# Patient Record
Sex: FEMALE | Race: WHITE | NOT HISPANIC OR LATINO | Employment: FULL TIME | URBAN - METROPOLITAN AREA
[De-identification: names, ages, dates, MRNs, and addresses within clinical notes are randomized per-mention and may not be internally consistent; named-entity substitution may affect disease eponyms.]

---

## 2016-08-08 LAB
ERYTHROCYTE [DISTWIDTH] IN BLOOD BY AUTOMATED COUNT: 12.7 %
HBA1C MFR BLD: 5 % (ref 0–5.7)
HCT VFR BLD AUTO: 35.9 %
HEMOGLOBIN: 12.2 G/DL (ref 11.7–15.7)
MCH RBC QN AUTO: NORMAL PG
MCHC RBC AUTO-ENTMCNC: NORMAL G/DL
MCV RBC AUTO: 92.1 FL
PLATELET # BLD AUTO: 310 10^9/L
RBC # BLD AUTO: 3.9 10^12/L
WBC # BLD AUTO: 11.2 10^9/L

## 2016-12-09 LAB — HEMOGLOBIN: 11.1 G/DL (ref 11.7–15.7)

## 2017-01-04 ENCOUNTER — PRE VISIT (OUTPATIENT)
Dept: CARDIOLOGY | Facility: CLINIC | Age: 27
End: 2017-01-04

## 2017-01-04 ENCOUNTER — TELEPHONE (OUTPATIENT)
Dept: CARDIOLOGY | Facility: CLINIC | Age: 27
End: 2017-01-04

## 2017-01-04 ENCOUNTER — DOCUMENTATION ONLY (OUTPATIENT)
Dept: CARDIOLOGY | Facility: CLINIC | Age: 27
End: 2017-01-04

## 2017-01-04 NOTE — TELEPHONE ENCOUNTER
Called pt. To discuss OV. 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  Pt. Stated she will bring the EKG report with her to her OV on 1/6/17. All other records received and sent for scanning.

## 2017-01-05 ENCOUNTER — TELEPHONE (OUTPATIENT)
Dept: CARDIOLOGY | Facility: CLINIC | Age: 27
End: 2017-01-05

## 2017-01-05 NOTE — TELEPHONE ENCOUNTER
Pt. Has US scheduled in [place] in AM and cardiology OV in [place] in PM.  Called pt. To make sure she is aware of the different locations.  Pt. States she is aware of the two different locations for her same day appointments.

## 2017-01-06 ENCOUNTER — OFFICE VISIT (OUTPATIENT)
Dept: CARDIOLOGY | Facility: CLINIC | Age: 27
End: 2017-01-06
Payer: COMMERCIAL

## 2017-01-06 ENCOUNTER — OFFICE VISIT (OUTPATIENT)
Dept: MATERNAL FETAL MEDICINE | Facility: CLINIC | Age: 27
End: 2017-01-06
Attending: OBSTETRICS & GYNECOLOGY
Payer: COMMERCIAL

## 2017-01-06 ENCOUNTER — HOSPITAL ENCOUNTER (OUTPATIENT)
Dept: ULTRASOUND IMAGING | Facility: CLINIC | Age: 27
Discharge: HOME | End: 2017-01-06
Attending: OBSTETRICS & GYNECOLOGY | Admitting: OBSTETRICS & GYNECOLOGY
Payer: COMMERCIAL

## 2017-01-06 VITALS
DIASTOLIC BLOOD PRESSURE: 50 MMHG | SYSTOLIC BLOOD PRESSURE: 116 MMHG | HEIGHT: 65 IN | BODY MASS INDEX: 36.85 KG/M2 | WEIGHT: 221.2 LBS | HEART RATE: 110 BPM

## 2017-01-06 DIAGNOSIS — O30.042 DICHORIONIC DIAMNIOTIC TWIN PREGNANCY IN SECOND TRIMESTER: ICD-10-CM

## 2017-01-06 DIAGNOSIS — R00.0 TACHYCARDIA: ICD-10-CM

## 2017-01-06 DIAGNOSIS — R00.0 TACHYCARDIA: Primary | ICD-10-CM

## 2017-01-06 DIAGNOSIS — O30.043 DICHORIONIC DIAMNIOTIC TWIN PREGNANCY IN THIRD TRIMESTER: Primary | ICD-10-CM

## 2017-01-06 LAB
ALBUMIN SERPL-MCNC: 2.6 G/DL (ref 3.4–5)
ALP SERPL-CCNC: 102 U/L (ref 40–150)
ALT SERPL W P-5'-P-CCNC: 13 U/L (ref 0–50)
ANION GAP SERPL CALCULATED.3IONS-SCNC: 11 MMOL/L (ref 3–14)
AST SERPL W P-5'-P-CCNC: 14 U/L (ref 0–45)
BILIRUB SERPL-MCNC: 0.3 MG/DL (ref 0.2–1.3)
BUN SERPL-MCNC: 6 MG/DL (ref 7–30)
CALCIUM SERPL-MCNC: 8.6 MG/DL (ref 8.5–10.1)
CHLORIDE SERPL-SCNC: 105 MMOL/L (ref 94–109)
CO2 SERPL-SCNC: 22 MMOL/L (ref 20–32)
CREAT SERPL-MCNC: 0.56 MG/DL (ref 0.52–1.04)
ERYTHROCYTE [DISTWIDTH] IN BLOOD BY AUTOMATED COUNT: 13.5 % (ref 10–15)
GFR SERPL CREATININE-BSD FRML MDRD: ABNORMAL ML/MIN/1.7M2
GLUCOSE SERPL-MCNC: 81 MG/DL (ref 70–99)
HCT VFR BLD AUTO: 32.9 % (ref 35–47)
HGB BLD-MCNC: 10.9 G/DL (ref 11.7–15.7)
MCH RBC QN AUTO: 30.4 PG (ref 26.5–33)
MCHC RBC AUTO-ENTMCNC: 33.1 G/DL (ref 31.5–36.5)
MCV RBC AUTO: 92 FL (ref 78–100)
PLATELET # BLD AUTO: 263 10E9/L (ref 150–450)
POTASSIUM SERPL-SCNC: 3.8 MMOL/L (ref 3.4–5.3)
PROT SERPL-MCNC: 6.7 G/DL (ref 6.8–8.8)
RBC # BLD AUTO: 3.59 10E12/L (ref 3.8–5.2)
SODIUM SERPL-SCNC: 138 MMOL/L (ref 133–144)
TSH SERPL DL<=0.005 MIU/L-ACNC: 0.9 MU/L (ref 0.4–4)
WBC # BLD AUTO: 10.4 10E9/L (ref 4–11)

## 2017-01-06 PROCEDURE — 99204 OFFICE O/P NEW MOD 45 MIN: CPT | Performed by: INTERNAL MEDICINE

## 2017-01-06 PROCEDURE — 36415 COLL VENOUS BLD VENIPUNCTURE: CPT | Performed by: INTERNAL MEDICINE

## 2017-01-06 PROCEDURE — 76816 OB US FOLLOW-UP PER FETUS: CPT | Mod: 59

## 2017-01-06 PROCEDURE — 93000 ELECTROCARDIOGRAM COMPLETE: CPT | Performed by: INTERNAL MEDICINE

## 2017-01-06 PROCEDURE — 99000 SPECIMEN HANDLING OFFICE-LAB: CPT | Performed by: INTERNAL MEDICINE

## 2017-01-06 PROCEDURE — 85027 COMPLETE CBC AUTOMATED: CPT | Mod: 90 | Performed by: INTERNAL MEDICINE

## 2017-01-06 PROCEDURE — 84443 ASSAY THYROID STIM HORMONE: CPT | Mod: 90 | Performed by: INTERNAL MEDICINE

## 2017-01-06 PROCEDURE — 80053 COMPREHEN METABOLIC PANEL: CPT | Mod: 90 | Performed by: INTERNAL MEDICINE

## 2017-01-06 NOTE — PROGRESS NOTES
REASON FOR VISIT:  New patient evaluation for sinus tachycardia.      HISTORY OF PRESENT ILLNESS:  Ms. Miner is a very pleasant 26-year-old lady who is 30 weeks' pregnant with twins.  She states she notes a sensation of a rapid heartbeat when she is exerting herself up to 160 beats per minute and at rest as well and this has been found to be in sinus tachycardia.  She does have shortness of breath on exertion and sometimes notices at rest, particularly also she states if she leaned back in her chair.  She sleeps on her right side but is having difficulty with general discomfort as well as perhaps finding it a bit more hard to breathe recently.  She has no pedal edema.  She has no chest discomfort, presyncope or syncope.  Her weight is 221 pounds.  Weight in 2012 was 163 pounds.  EKG at this visit is entirely benign.      ASSESSMENT AND PLAN:  Pleasant 26-year-old lady with sinus tachycardia in the setting of an advanced pregnancy with twins.  I think this likely represents the result of possibly IVC compression and substantial fluid loads and shifts as supposed to a pathological mechanism.  As we have discussed sinus tachycardia is a secondary phenomenon.      We will go ahead and check a blood count, TSH and chemistries, as well as an echocardiogram for structural evaluation.  She denies any fevers or chills as other possible driving forces.      We will have her back in close followup and make further recommendations pending the results.  It was a pleasure being involved in her care.      Total time is 45 minutes, 40 in coordination, care and counseling.      Heaven Estrella MD      cc:   Nicolette Schaefer MD   [facility] [address] [place] [address]         HEAVEN ESTRELLA MD             D: 2017 14:38   T: 2017 15:41   MT: al      Name:     MILO MINER   MRN:      [id number]        Account:      [id number]   :      1990           Service Date:  01/06/2017      Document: [id number]

## 2017-01-06 NOTE — Clinical Note
1/6/2017    Nicolette Richard [facility]  [address] [place] [address]    RE: Evelyn HERRON Kristofer       Dear Colleague,    I had the pleasure of seeing Evelyn Miner in the [facility].    REASON FOR VISIT:  New patient evaluation for sinus tachycardia.      Ms. Miner is a very pleasant 26-year-old lady who is 30 weeks' pregnant with twins.  She states she notes a sensation of a rapid heartbeat when she is exerting herself up to 160 beats per minute and at rest as well and this has been found to be in sinus tachycardia.  She does have shortness of breath on exertion and sometimes notices at rest, particularly also she states if she leaned back in her chair.  She sleeps on her right side but is having difficulty with general discomfort as well as perhaps finding it a bit more hard to breathe recently.  She has no pedal edema.  She has no chest discomfort, presyncope or syncope.  Her weight is 221 pounds.  Weight in 2012 was 163 pounds.  EKG at this visit is entirely benign.      Outpatient Encounter Prescriptions as of 1/6/2017   Medication Sig Dispense Refill     Magnesium Hydroxide (MILK OF MAGNESIA PO) prn       Prenatal Vit-Fe Fumarate-FA (PRENATAL MULTIVITAMIN  PLUS IRON) 27-0.8 MG TABS Take 1 tablet by mouth daily       sennosides (SENOKOT) 8.6 MG tablet Take 1 tablet by mouth daily as needed        No facility-administered encounter medications on file as of 1/6/2017.     ASSESSMENT AND PLAN:  Pleasant 26-year-old lady with sinus tachycardia in the setting of an advanced pregnancy with twins.  I think this likely represents the result of possibly IVC compression and substantial fluid loads and shifts as supposed to a pathological mechanism.  As we have discussed sinus tachycardia is a secondary phenomenon.      We will go ahead and check a blood count, TSH and chemistries, as well as an echocardiogram for structural evaluation.  She denies any fevers or  chills as other possible driving forces.      We will have her back in close followup and make further recommendations pending the results.  It was a pleasure being involved in her care.      Total time is 45 minutes, 40 in coordination, care and counseling.     Sincerely,    Heaven Estrella MD     [facility]

## 2017-01-06 NOTE — PROGRESS NOTES
"Please see \"Imaging\" tab under \"Chart Review\" for details of today's US at the [facility].    Tyler Cordova MD  Maternal-Fetal Medicine    "

## 2017-01-06 NOTE — PROGRESS NOTES
DIAGNOSES:      Encounter Diagnosis   Name Primary?     Tachycardia Yes         HPI:  See dictation        MEDICATIONS:    Current Outpatient Prescriptions   Medication Sig Dispense Refill     Magnesium Hydroxide (MILK OF MAGNESIA PO) prn       Prenatal Vit-Fe Fumarate-FA (PRENATAL MULTIVITAMIN  PLUS IRON) 27-0.8 MG TABS Take 1 tablet by mouth daily       sennosides (SENOKOT) 8.6 MG tablet Take 1 tablet by mouth daily as needed            ALLERGIES   No Known Allergies    PAST MEDICAL HISTORY:  Past Medical History   Diagnosis Date     Headache(784.0)      Other convulsions      Unspecified disorder of ankle and foot joint      ankle fracture lt     JOINT PAIN-LOWER LEG 10/4/2006     Pregnancy      twins, PATRICK 3/2017       PAST SURGICAL HISTORY:  Past Surgical History   Procedure Laterality Date     C nonspecific procedure       Left knee exam under anesthesia and long-     Appendectomy       Ent surgery         FAMILY HISTORY:  Family History   Problem Relation Age of Onset     C.A.D. Maternal Grandmother      Hypertension Maternal Grandmother      Blood Disease Maternal Grandfather      leukemia        SOCIAL HISTORY:  Social History     Social History     Marital Status:      Spouse Name: N/A     Number of Children: N/A     Years of Education: N/A     Occupational History     [employer]     Social History Main Topics     Smoking status: Never Smoker      Smokeless tobacco: Never Used     Alcohol Use: No      Comment: rare     Drug Use: No     Sexual Activity:     Partners: Male     Birth Control/ Protection: Condom     Other Topics Concern     Special Diet No     Exercise No     Social History Narrative       Review of Systems:  Skin:  Negative     Eyes:  Positive for glasses  ENT:  Negative    Respiratory:  Positive for dyspnea on exertion  Cardiovascular:    Positive for;palpitations;dizziness  Gastroenterology: Negative    Genitourinary:  Negative    Musculoskeletal:  Positive for  "joint pain (both hands are knumb sometimes)  Neurologic:  Negative    Psychiatric:  Negative    Heme/Lymph/Imm:  Negative    Endocrine:  Negative      Physical Exam:  Vitals: /50 mmHg  Pulse 110  Ht 1.651 m (5' 5\")  Wt 100.336 kg (221 lb 3.2 oz)  BMI 36.81 kg/m2  LMP 06/10/2016    Constitutional:  cooperative, alert and oriented, well developed, well nourished, in no acute distress        Skin:  warm and dry to the touch, no apparent skin lesions or masses noted        Head:  normocephalic, no masses or lesions        Eyes:  pupils equal and round, conjunctivae and lids unremarkable, sclera white, no xanthalasma, EOMS intact, no nystagmus        ENT:  no pallor or cyanosis, dentition good        Neck:  carotid pulses are full and equal bilaterally;JVP normal;no carotid bruit        Chest:  normal breath sounds, clear to auscultation, normal A-P diameter, normal symmetry, normal respiratory excursion, no use of accessory muscles        Cardiac: regular rhythm, normal S1/S2, no S3 or S4, apical impulse not displaced, no murmurs, gallops or rubs                  Abdomen:      pregnant, very prominent    Vascular: pulses full and equal                                        Extremities and Back:  no edema              Neurological:  no gross motor deficits;affect appropriate, oriented to time, person and place          ASSESSMENT AND PLAN:    See dictation    ORDERS AT TODAY'S VISIT:    Orders Placed This Encounter   Procedures     Comprehensive metabolic panel     CBC with platelets     TSH with free T4 reflex     Follow-Up with Cardiologist     EKG 12-lead complete w/read - Clinics     Echocardiogram Complete           CC  Nicolette Richard [facility]  [address] [place] [address]              "

## 2017-01-09 ENCOUNTER — TELEPHONE (OUTPATIENT)
Dept: CARDIOLOGY | Facility: CLINIC | Age: 27
End: 2017-01-09

## 2017-01-09 NOTE — TELEPHONE ENCOUNTER
Pt. Called asking about her lab results she had done on 1/6/17.  Relayed results to pt. Pt. Stated understanding. Pt. Has OV 1/27/17.

## 2017-01-20 ENCOUNTER — OFFICE VISIT (OUTPATIENT)
Dept: MATERNAL FETAL MEDICINE | Facility: CLINIC | Age: 27
End: 2017-01-20
Attending: OBSTETRICS & GYNECOLOGY
Payer: COMMERCIAL

## 2017-01-20 ENCOUNTER — HOSPITAL ENCOUNTER (OUTPATIENT)
Dept: ULTRASOUND IMAGING | Facility: CLINIC | Age: 27
Discharge: HOME | End: 2017-01-20
Attending: OBSTETRICS & GYNECOLOGY | Admitting: OBSTETRICS & GYNECOLOGY
Payer: COMMERCIAL

## 2017-01-20 DIAGNOSIS — O30.043 DICHORIONIC DIAMNIOTIC TWIN PREGNANCY IN THIRD TRIMESTER: ICD-10-CM

## 2017-01-20 DIAGNOSIS — O30.043 DICHORIONIC DIAMNIOTIC TWIN PREGNANCY IN THIRD TRIMESTER: Primary | ICD-10-CM

## 2017-01-20 PROCEDURE — 76819 FETAL BIOPHYS PROFIL W/O NST: CPT

## 2017-01-20 NOTE — PROGRESS NOTES
"Please see \"Imaging\" tab under \"Chart Review\" for details of today's US.      Kari Mays, DO  Maternal-Fetal Medicine  January 20, 2017      "

## 2017-01-27 ENCOUNTER — OFFICE VISIT (OUTPATIENT)
Dept: MATERNAL FETAL MEDICINE | Facility: CLINIC | Age: 27
End: 2017-01-27
Attending: OBSTETRICS & GYNECOLOGY
Payer: COMMERCIAL

## 2017-01-27 ENCOUNTER — HOSPITAL ENCOUNTER (OUTPATIENT)
Dept: ULTRASOUND IMAGING | Facility: CLINIC | Age: 27
Discharge: HOME | End: 2017-01-27
Attending: OBSTETRICS & GYNECOLOGY | Admitting: OBSTETRICS & GYNECOLOGY
Payer: COMMERCIAL

## 2017-01-27 DIAGNOSIS — O30.043 DICHORIONIC DIAMNIOTIC TWIN PREGNANCY IN THIRD TRIMESTER: Primary | ICD-10-CM

## 2017-01-27 DIAGNOSIS — O30.043 DICHORIONIC DIAMNIOTIC TWIN PREGNANCY IN THIRD TRIMESTER: ICD-10-CM

## 2017-01-27 PROCEDURE — 76819 FETAL BIOPHYS PROFIL W/O NST: CPT

## 2017-01-27 NOTE — PROGRESS NOTES
"Please see \"Imaging\" tab under \"Chart Review\" for details of today's US at the [facility].    Tyler Cordova MD  Maternal-Fetal Medicine      "

## 2017-01-29 ENCOUNTER — HOSPITAL ENCOUNTER (OUTPATIENT)
Facility: CLINIC | Age: 27
Discharge: HOME | End: 2017-01-29
Attending: OBSTETRICS & GYNECOLOGY | Admitting: OBSTETRICS & GYNECOLOGY
Payer: COMMERCIAL

## 2017-01-29 LAB — FIBRONECTIN FETAL VAG QL: NORMAL

## 2017-01-29 PROCEDURE — 99214 OFFICE O/P EST MOD 30 MIN: CPT

## 2017-01-29 PROCEDURE — 99214 OFFICE O/P EST MOD 30 MIN: CPT | Mod: 25

## 2017-01-29 PROCEDURE — 82731 ASSAY OF FETAL FIBRONECTIN: CPT | Performed by: OBSTETRICS & GYNECOLOGY

## 2017-01-29 NOTE — IP AVS SNAPSHOT
MRN:[id number]                      After Visit Summary   1/29/2017    Evelyn Miner    MRN: [id number]           Thank you!     Thank you for choosing [facility] for your care. Our goal is always to provide you with excellent care. Hearing back from our patients is one way we can continue to improve our services. Please take a few minutes to complete the written survey that you may receive in the mail after you visit. If you would like to speak to someone directly about your visit please contact Patient Relations at [phone]. Thank you!          Patient Information     Date Of Birth          1990        About your hospital stay     You were admitted on:  January 29, 2017 You last received care in the:  [facility] Labor and Delivery    You were discharged on:  January 29, 2017       Who to Call     For medical emergencies, please call 911.  For non-urgent questions about your medical care, please call your primary care provider or clinic, [phone]          Attending Provider     Provider    Humza Bustamante MD       Primary Care Provider Office Phone # Fax #    Nicolette Richard [facility] [phone]       [facility] [address] [place] [address]        Your next 10 appointments already scheduled     Feb 03, 2017  1:30 PM   MFM US COMPRE TWINS F/U with [id number]   [facility])    [address]  [facility] [id number] [phone]           Wear comfortable clothes and leave your valuables at home.            Feb 03, 2017  2:00 PM   Radiology MD with  MFELDON ELIAS   [facility])    [address]  [facility] [id number] [phone]           Please arrive at the time given for your first appointment.  This visit is used internally to schedule the physician's time during your  ultrasound.              Further instructions from your care team       Discharge Instruction for Undelivered Patients      You were seen for: Labor Assessment, Swelling, Shortness of Breath  We Consulted: Dr. Bustamante  You had (Test or Medicine):Fetal and uterine monitoring, fetal fibronectin     Diet:   Drink 8 to 12 glasses of liquids (milk, juice, water) every day.  You may eat meals and snacks.     Activity:  Count fetal kicks everyday (see handout)  Call your doctor or nurse midwife if your baby is moving less than usual.     Call your provider if you notice:  Swelling in your face or increased swelling in your hands or legs.  Headaches that are not relieved by Tylenol (acetaminophen).  Changes in your vision (blurring: seeing spots or stars.)  Nausea (sick to your stomach) and vomiting (throwing up).   Weight gain of 5 pounds or more per week.  Heartburn that doesn't go away.  Signs of bladder infection: pain when you urinate (use the toilet), need to go more often and more urgently.  The bag of andres (rupture of membranes) breaks, or you notice leaking in your underwear.  Bright red blood in your underwear.  Abdominal (lower belly) or stomach pain.  Second (plus) baby: Contractions (tightening) less than 10 minutes apart and getting stronger.  *If less than 34 weeks: Contractions (tightenings) more than 6 times in one hour.  Increase or change in vaginal discharge (note the color and amount)  Other: Rest, and please do not hesitate to contact your doctor with questions or concerns.    Follow-up:  As scheduled in the clinic          Pending Results     No orders found from 1/28/2017 to 1/30/2017.            Admission Information        Provider Department Dept Phone    1/29/2017 Humza Bustamante MD  Labor And Delivery [phone]      Your Vitals Were     Blood Pressure Pulse Temperature Respirations Last Period       110/72 mmHg 102 98.3  F (36.8  C) (Oral) 20 06/10/2016       MyChart Information      [facility] gives you secure access to your electronic health record. If you see a primary care provider, you can also send messages to your care team and make appointments. If you have questions, please call your primary care clinic.  If you do not have a primary care provider, please call [phone] and they will assist you.        Care EveryWhere ID     This is your Care EveryWhere ID. This could be used by other organizations to access your [facility] medical records  [id number]           Review of your medicines      UNREVIEWED medicines. Ask your doctor about these medicines        Dose / Directions    MILK OF MAGNESIA PO        prn   Refills:  0       prenatal multivitamin  plus iron 27-0.8 MG Tabs per tablet        Dose:  1 tablet   Take 1 tablet by mouth daily   Refills:  0       sennosides 8.6 MG tablet   Commonly known as:  SENOKOT        Dose:  1 tablet   Take 1 tablet by mouth daily as needed   Refills:  0                Protect others around you: Learn how to safely use, store and throw away your medicines at www.disposemymeds.org.             Medication List: This is a list of all your medications and when to take them. Check marks below indicate your daily home schedule. Keep this list as a reference.      Medications           Morning Afternoon Evening Bedtime As Needed    MILK OF MAGNESIA PO   prn                                prenatal multivitamin  plus iron 27-0.8 MG Tabs per tablet   Take 1 tablet by mouth daily                                sennosides 8.6 MG tablet   Commonly known as:  SENOKOT   Take 1 tablet by mouth daily as needed

## 2017-01-29 NOTE — IP AVS SNAPSHOT
[facility] Labor and Delivery    [address]    [facility] [address]    Phone:  [phone]    Fax:  [phone]                                       After Visit Summary   1/29/2017    Evelyn Miner    MRN: [id number]           After Visit Summary Signature Page     I have received my discharge instructions, and my questions have been answered. I have discussed any challenges I see with this plan with the nurse or doctor.    ..........................................................................................................................................  Patient/Patient Representative Signature      ..........................................................................................................................................  Patient Representative Print Name and Relationship to Patient    ..................................................               ................................................  Date                                            Time    ..........................................................................................................................................  Reviewed by Signature/Title    ...................................................              ..............................................  Date                                                            Time

## 2017-01-30 VITALS
HEART RATE: 102 BPM | DIASTOLIC BLOOD PRESSURE: 72 MMHG | RESPIRATION RATE: 20 BRPM | TEMPERATURE: 98.3 F | OXYGEN SATURATION: 96 % | SYSTOLIC BLOOD PRESSURE: 110 MMHG

## 2017-01-30 NOTE — PLAN OF CARE
"Data: Patient presented to [facility].  Report received from MIA Fernández.  Care assumed at this time.  Reason for maternal/fetal assessment per patient is swelling and shortness of breath. Patient reports having clinicals today, and has been sitting most of the day.  Patient reports leaving clinicals and noticing that she had increased shortness of breath.  Patient reports, \"I always have some, but it just seemed worse than normal.\"  Patient reports looking at her legs and noting +3 pitting edema.  Patient reports some contractions, \"but nothing regular.\"  Patient is a .  Prenatal record reviewed.   Gestational Age 33.3. VSS. Fetal movement present. Patient denies backache, abnormal vaginal discharge, pelvic pressure, UTI symptoms, GI problems, bloody show, vaginal bleeding, edema, headache, visual disturbances, epigastric or URQ pain, abdominal pain, rupture of membranes.   Action: Verbal consent for EFM. Triage assessment completed. Bill of rights reviewed.  MIA Fernández reports difficulty in tracing both FHR, especially FHR B. An RN continually at patient bedside attempting to trace both FHR until approx. .  Response: Patient verbalized agreement with plan. Per MIA Fernández, Dr. Bustamante provided orders for NST and FFN if patient monica.  Ordered to assess for DVT and order dopplers if indicated.  Will contact Dr Bustamante once results available.  "

## 2017-01-30 NOTE — PROVIDER NOTIFICATION
01/29/17 2123   Provider Notification   Provider Name/Title Dr. Bustamante   Method of Notification In Department   Request Evaluate - Remote   Dr. Bustamante in department.  Reviewed pt complaint and pt report that shortness of breath has improved since arrival to unit.  Discussed pt O2 sats between 96-99%.  Reviewed pt BP.  Dr. Bustamante reviewed FHR strip and stated he is okay with current strip, MD is aware that RN's have had difficulties obtaining a continuous tracing.  Asked Dr. Bustamante if he would prefer a more continuous NST and Dr. Bustamante declined, stating that current strip is sufficient.  Discussed negative FFN.  Dr. Bustamante verbalized understanding, provided with order to discharge pt home with follow up at next appointment on Friday.  Will update pt on POC and continue to monitor.

## 2017-01-30 NOTE — PROVIDER NOTIFICATION
"   01/29/17 2051   Provider Notification   Provider Name/Title Dr. Bustamante   Method of Notification Phone   Request Evaluate - Remote   Notification Reason Patient Arrived;Status Update   Dr. Bustamante notified of pt arrival around 1900, pt complaint (see nursing admit note), difficulty obtaining continuous NST for both FHR, although both FHR appear to be reactive.  Discussed that pt having irregular contractions that pt reports as \"not really feeling\" and that FFN is pending.  Dr. Bustamante verbalized understanding, stated he is on his way in for a delivery and will evaluate once he arrives.  Will continue to monitor.  "

## 2017-01-30 NOTE — PLAN OF CARE
Data: Patient presented to the Birthplace for swelling, shortness of breath, and contractions.  Cervical exam not performed.  Membranes intact.  Contractions irregular.  Action:  Presumed adequate fetal oxygenation documented (see flow record). Discharge instructions reviewed.  Patient instructed to report change in fetal movement, vaginal leaking of fluid or bleeding, abdominal pain, or any concerns related to the pregnancy to her nurse/physician.   Response: Orders to discharge home per Dr. Bustamante.  Patient verbalized understanding of education and verbalized agreement with plan.

## 2017-01-30 NOTE — DISCHARGE INSTRUCTIONS
Discharge Instruction for Undelivered Patients      You were seen for: Labor Assessment, Swelling, Shortness of Breath  We Consulted: Dr. Bustamante  You had (Test or Medicine):Fetal and uterine monitoring, fetal fibronectin     Diet:   Drink 8 to 12 glasses of liquids (milk, juice, water) every day.  You may eat meals and snacks.     Activity:  Count fetal kicks everyday (see handout)  Call your doctor or nurse midwife if your baby is moving less than usual.     Call your provider if you notice:  Swelling in your face or increased swelling in your hands or legs.  Headaches that are not relieved by Tylenol (acetaminophen).  Changes in your vision (blurring: seeing spots or stars.)  Nausea (sick to your stomach) and vomiting (throwing up).   Weight gain of 5 pounds or more per week.  Heartburn that doesn't go away.  Signs of bladder infection: pain when you urinate (use the toilet), need to go more often and more urgently.  The bag of andres (rupture of membranes) breaks, or you notice leaking in your underwear.  Bright red blood in your underwear.  Abdominal (lower belly) or stomach pain.  Second (plus) baby: Contractions (tightening) less than 10 minutes apart and getting stronger.  *If less than 34 weeks: Contractions (tightenings) more than 6 times in one hour.  Increase or change in vaginal discharge (note the color and amount)  Other: Rest, and please do not hesitate to contact your doctor with questions or concerns.    Follow-up:  As scheduled in the clinic

## 2017-02-02 ENCOUNTER — APPOINTMENT (OUTPATIENT)
Dept: ULTRASOUND IMAGING | Facility: CLINIC | Age: 27
End: 2017-02-02
Attending: OBSTETRICS & GYNECOLOGY
Payer: COMMERCIAL

## 2017-02-02 ENCOUNTER — HOSPITAL ENCOUNTER (OUTPATIENT)
Facility: CLINIC | Age: 27
Discharge: HOME | End: 2017-02-02
Attending: OBSTETRICS & GYNECOLOGY | Admitting: OBSTETRICS & GYNECOLOGY
Payer: COMMERCIAL

## 2017-02-02 VITALS — SYSTOLIC BLOOD PRESSURE: 116 MMHG | TEMPERATURE: 98 F | DIASTOLIC BLOOD PRESSURE: 68 MMHG

## 2017-02-02 PROCEDURE — 99214 OFFICE O/P EST MOD 30 MIN: CPT

## 2017-02-02 PROCEDURE — 76819 FETAL BIOPHYS PROFIL W/O NST: CPT

## 2017-02-02 NOTE — PROVIDER NOTIFICATION
02/02/17 1541   Provider Notification   Provider Name/Title Dr. Charles   Method of Notification Phone   Request Evaluate - Remote   Notification Reason Patient Arrived   Patient arrived to labor and delivery for DFM. Baby A and B FGHR were detected and fetal movement was felt. Baby B is on the monitor continuously, Baby A appears if monitor is held in place but difficult to get a continuous tracing.  Baby B is reactive. Baby has shows moderate varaibity.    MD order BPP. Call with results

## 2017-02-02 NOTE — PROVIDER NOTIFICATION
02/02/17 1648   Provider Notification   Provider Name/Title Dr. Charles   Method of Notification Phone   Request Evaluate - Remote   Called MD with BPP results 8/8 for both twins. Patient also has appointment with MD tomorrow     Orders received to discharge home

## 2017-02-02 NOTE — DISCHARGE INSTRUCTIONS
Discharge Instruction for Undelivered Patients      You were seen for: Fetal Assessment  We Consulted: Dr. Charles  You had (Test or Medicine):Fetal monitoring and Biophysical     Diet:   Drink 8 to 12 glasses of liquids (milk, juice, water) every day.  You may eat meals and snacks.     Activity:  Count fetal kicks everyday (see handout)  Call your doctor or nurse midwife if your baby is moving less than usual.     Call your provider if you notice:  Swelling in your face or increased swelling in your hands or legs.  Headaches that are not relieved by Tylenol (acetaminophen).  Changes in your vision (blurring: seeing spots or stars.)  Nausea (sick to your stomach) and vomiting (throwing up).   Weight gain of 5 pounds or more per week.  Heartburn that doesn't go away.  Signs of bladder infection: pain when you urinate (use the toilet), need to go more often and more urgently.  The bag of andres (rupture of membranes) breaks, or you notice leaking in your underwear.  Bright red blood in your underwear.  Abdominal (lower belly) or stomach pain.  For first baby: Contractions (tightening) less than 5 minutes apart for one hour or more.  Second (plus) baby: Contractions (tightening) less than 10 minutes apart and getting stronger.  *If less than 34 weeks: Contractions (tightenings) more than 6 times in one hour.  Increase or change in vaginal discharge (note the color and amount)  Other:     Follow-up:  As scheduled in the clinic

## 2017-02-02 NOTE — IP AVS SNAPSHOT
[facility] Labor and Delivery    [address]    [facility] [address]    Phone:  [phone]    Fax:  [phone]                                       After Visit Summary   2/2/2017    Evelyn Miner    MRN: [id number]           After Visit Summary Signature Page     I have received my discharge instructions, and my questions have been answered. I have discussed any challenges I see with this plan with the nurse or doctor.    ..........................................................................................................................................  Patient/Patient Representative Signature      ..........................................................................................................................................  Patient Representative Print Name and Relationship to Patient    ..................................................               ................................................  Date                                            Time    ..........................................................................................................................................  Reviewed by Signature/Title    ...................................................              ..............................................  Date                                                            Time

## 2017-02-02 NOTE — IP AVS SNAPSHOT
MRN:[id number]                      After Visit Summary   2/2/2017    Evelyn Miner    MRN: [id number]           Thank you!     Thank you for choosing [facility] for your care. Our goal is always to provide you with excellent care. Hearing back from our patients is one way we can continue to improve our services. Please take a few minutes to complete the written survey that you may receive in the mail after you visit. If you would like to speak to someone directly about your visit please contact Patient Relations at [phone]. Thank you!          Patient Information     Date Of Birth          1990        About your hospital stay     You were admitted on:  February 2, 2017 You last received care in the:  [facility] Labor and Delivery    You were discharged on:  February 2, 2017       Who to Call     For medical emergencies, please call 911.  For non-urgent questions about your medical care, please call your primary care provider or clinic, [phone]          Attending Provider     Provider    Radha Charles MD       Primary Care Provider Office Phone # Fax #    [place] [facility] [phone]       [facility] [address] [place] [address]        Your next 10 appointments already scheduled     Feb 03, 2017  1:30 PM   MFM US COMPRE TWINS F/U with MFMUSR1   [facility]etal [facility] Ultrasound - [facility])    [address]  [facility] [address] [phone]           Wear comfortable clothes and leave your valuables at home.            Feb 03, 2017  2:00 PM   Radiology MD with  CHARLOTTE ELIAS   [facility]etal [facility] - [facility])    [address]  [facility] [id number] [phone]           Please arrive at the time given for your first appointment.  This visit is used internally to schedule the physician's time during your  ultrasound.              Further instructions from your care team       Discharge Instruction for Undelivered Patients      You were seen for: Fetal Assessment  We Consulted: Dr. Charles  You had (Test or Medicine):Fetal monitoring and Biophysical     Diet:   Drink 8 to 12 glasses of liquids (milk, juice, water) every day.  You may eat meals and snacks.     Activity:  Count fetal kicks everyday (see handout)  Call your doctor or nurse midwife if your baby is moving less than usual.     Call your provider if you notice:  Swelling in your face or increased swelling in your hands or legs.  Headaches that are not relieved by Tylenol (acetaminophen).  Changes in your vision (blurring: seeing spots or stars.)  Nausea (sick to your stomach) and vomiting (throwing up).   Weight gain of 5 pounds or more per week.  Heartburn that doesn't go away.  Signs of bladder infection: pain when you urinate (use the toilet), need to go more often and more urgently.  The bag of andres (rupture of membranes) breaks, or you notice leaking in your underwear.  Bright red blood in your underwear.  Abdominal (lower belly) or stomach pain.  For first baby: Contractions (tightening) less than 5 minutes apart for one hour or more.  Second (plus) baby: Contractions (tightening) less than 10 minutes apart and getting stronger.  *If less than 34 weeks: Contractions (tightenings) more than 6 times in one hour.  Increase or change in vaginal discharge (note the color and amount)  Other:     Follow-up:  As scheduled in the clinic          Pending Results     No orders found from 2/1/2017 to 2/3/2017.            Admission Information        Provider Department Dept Phone    2/2/2017 Radha Charles MD  Labor And Delivery [phone]      Your Vitals Were     Blood Pressure Temperature Last Period             116/68 mmHg 98  F (36.7  C) (Oral) 06/10/2016         [employer] Information     [employer] gives you secure access to your electronic health  record. If you see a primary care provider, you can also send messages to your care team and make appointments. If you have questions, please call your primary care clinic.  If you do not have a primary care provider, please call [phone] and they will assist you.        Care EveryWhere ID     This is your Care EveryWhere ID. This could be used by other organizations to access your [place] medical records  [id number]           Review of your medicines      UNREVIEWED medicines. Ask your doctor about these medicines        Dose / Directions    MILK OF MAGNESIA PO        prn   Refills:  0       prenatal multivitamin  plus iron 27-0.8 MG Tabs per tablet        Dose:  1 tablet   Take 1 tablet by mouth daily   Refills:  0       sennosides 8.6 MG tablet   Commonly known as:  SENOKOT        Dose:  1 tablet   Take 1 tablet by mouth daily as needed   Refills:  0                Protect others around you: Learn how to safely use, store and throw away your medicines at www.disposemymeds.org.             Medication List: This is a list of all your medications and when to take them. Check marks below indicate your daily home schedule. Keep this list as a reference.      Medications           Morning Afternoon Evening Bedtime As Needed    MILK OF MAGNESIA PO   prn                                prenatal multivitamin  plus iron 27-0.8 MG Tabs per tablet   Take 1 tablet by mouth daily                                sennosides 8.6 MG tablet   Commonly known as:  SENOKOT   Take 1 tablet by mouth daily as needed

## 2017-02-03 ENCOUNTER — HOSPITAL ENCOUNTER (OUTPATIENT)
Dept: ULTRASOUND IMAGING | Facility: CLINIC | Age: 27
Discharge: HOME | End: 2017-02-03
Attending: OBSTETRICS & GYNECOLOGY | Admitting: OBSTETRICS & GYNECOLOGY
Payer: COMMERCIAL

## 2017-02-03 ENCOUNTER — OFFICE VISIT (OUTPATIENT)
Dept: MATERNAL FETAL MEDICINE | Facility: CLINIC | Age: 27
End: 2017-02-03
Attending: OBSTETRICS & GYNECOLOGY
Payer: COMMERCIAL

## 2017-02-03 DIAGNOSIS — O30.043 DICHORIONIC DIAMNIOTIC TWIN PREGNANCY IN THIRD TRIMESTER: ICD-10-CM

## 2017-02-03 DIAGNOSIS — O30.042 DICHORIONIC DIAMNIOTIC TWIN PREGNANCY IN SECOND TRIMESTER: Primary | ICD-10-CM

## 2017-02-03 PROCEDURE — 76819 FETAL BIOPHYS PROFIL W/O NST: CPT | Performed by: OBSTETRICS & GYNECOLOGY

## 2017-02-03 PROCEDURE — 76816 OB US FOLLOW-UP PER FETUS: CPT | Mod: 59

## 2017-02-03 RX ORDER — SODIUM CHLORIDE, SODIUM LACTATE, POTASSIUM CHLORIDE, CALCIUM CHLORIDE 600; 310; 30; 20 MG/100ML; MG/100ML; MG/100ML; MG/100ML
INJECTION, SOLUTION INTRAVENOUS CONTINUOUS
Status: CANCELLED | OUTPATIENT
Start: 2017-02-03

## 2017-02-03 RX ORDER — CEFAZOLIN SODIUM 2 G/100ML
2 INJECTION, SOLUTION INTRAVENOUS
Status: CANCELLED | OUTPATIENT
Start: 2017-02-03

## 2017-02-03 RX ORDER — CEFAZOLIN SODIUM 1 G/3ML
1 INJECTION, POWDER, FOR SOLUTION INTRAMUSCULAR; INTRAVENOUS
Status: CANCELLED | OUTPATIENT
Start: 2017-02-03

## 2017-02-03 NOTE — PROGRESS NOTES
"Please see \"Imaging\" tab under \"Chart Review\" for details of today's US.      Kari Mays, DO  Maternal-Fetal Medicine  February 3, 2017      "

## 2017-02-05 ENCOUNTER — HOSPITAL ENCOUNTER (OUTPATIENT)
Facility: CLINIC | Age: 27
LOS: 1 days | Discharge: HOME | End: 2017-02-05
Attending: OBSTETRICS & GYNECOLOGY | Admitting: OBSTETRICS & GYNECOLOGY
Payer: COMMERCIAL

## 2017-02-05 VITALS
HEIGHT: 66 IN | BODY MASS INDEX: 37.12 KG/M2 | SYSTOLIC BLOOD PRESSURE: 131 MMHG | RESPIRATION RATE: 18 BRPM | DIASTOLIC BLOOD PRESSURE: 73 MMHG | TEMPERATURE: 98.4 F | WEIGHT: 231 LBS

## 2017-02-05 LAB
ALBUMIN UR-MCNC: NEGATIVE MG/DL
APPEARANCE UR: CLEAR
BACTERIA #/AREA URNS HPF: ABNORMAL /HPF
BILIRUB UR QL STRIP: NEGATIVE
COLOR UR AUTO: ABNORMAL
FIBRONECTIN FETAL VAG QL: NORMAL
GLUCOSE UR STRIP-MCNC: NEGATIVE MG/DL
HGB UR QL STRIP: NEGATIVE
KETONES UR STRIP-MCNC: NEGATIVE MG/DL
LEUKOCYTE ESTERASE UR QL STRIP: ABNORMAL
MUCOUS THREADS #/AREA URNS LPF: PRESENT /LPF
NITRATE UR QL: NEGATIVE
PH UR STRIP: 7 PH (ref 5–7)
RBC #/AREA URNS AUTO: 1 /HPF (ref 0–2)
SP GR UR STRIP: 1 (ref 1–1.03)
SQUAMOUS #/AREA URNS AUTO: 1 /HPF (ref 0–1)
TRANS CELLS #/AREA URNS HPF: <1 /HPF (ref 0–1)
URN SPEC COLLECT METH UR: ABNORMAL
UROBILINOGEN UR STRIP-MCNC: NORMAL MG/DL (ref 0–2)
WBC #/AREA URNS AUTO: 3 /HPF (ref 0–2)

## 2017-02-05 PROCEDURE — 82731 ASSAY OF FETAL FIBRONECTIN: CPT | Performed by: OBSTETRICS & GYNECOLOGY

## 2017-02-05 PROCEDURE — 96372 THER/PROPH/DIAG INJ SC/IM: CPT

## 2017-02-05 PROCEDURE — 99214 OFFICE O/P EST MOD 30 MIN: CPT | Mod: 25

## 2017-02-05 PROCEDURE — 25000125 ZZHC RX 250: Performed by: OBSTETRICS & GYNECOLOGY

## 2017-02-05 PROCEDURE — 96360 HYDRATION IV INFUSION INIT: CPT

## 2017-02-05 PROCEDURE — 25800025 ZZH RX 258: Performed by: OBSTETRICS & GYNECOLOGY

## 2017-02-05 PROCEDURE — 81001 URINALYSIS AUTO W/SCOPE: CPT | Performed by: OBSTETRICS & GYNECOLOGY

## 2017-02-05 RX ORDER — ONDANSETRON 2 MG/ML
4 INJECTION INTRAMUSCULAR; INTRAVENOUS EVERY 6 HOURS PRN
Status: DISCONTINUED | OUTPATIENT
Start: 2017-02-05 | End: 2017-02-05 | Stop reason: HOSPADM

## 2017-02-05 RX ORDER — TERBUTALINE SULFATE 1 MG/ML
0.25 INJECTION, SOLUTION SUBCUTANEOUS
Status: COMPLETED | OUTPATIENT
Start: 2017-02-05 | End: 2017-02-05

## 2017-02-05 RX ADMIN — SODIUM CHLORIDE, POTASSIUM CHLORIDE, SODIUM LACTATE AND CALCIUM CHLORIDE 500 ML: 600; 310; 30; 20 INJECTION, SOLUTION INTRAVENOUS at 14:23

## 2017-02-05 RX ADMIN — TERBUTALINE SULFATE 0.25 MG: 1 INJECTION, SOLUTION SUBCUTANEOUS at 15:11

## 2017-02-05 NOTE — PROVIDER NOTIFICATION
02/05/17 1645   Provider Notification   Provider Name/Title Dr Guerrero   Method of Notification Phone   Request Evaluate - Remote   Notification Reason Status Update   Updated on neg FFN, terb dose was given, contraction pattern, FHR tracings. Orders to discharge pt to home with note to be off of work until she has next clinic appt, which she should schedule for this week.

## 2017-02-05 NOTE — DISCHARGE INSTRUCTIONS
Discharge Instruction for Undelivered Patients      You were seen for: Labor Assessment  We Consulted: Dr Guerrero  You had (Test or Medicine):fetal and uterine monitoring, UA, FFN, IV fluids     Diet:   Drink 8 to 12 glasses of liquids (milk, juice, water) every day.  You may eat meals and snacks.     Activity:  Off of work until appt this week in clinic.  Count fetal kicks everyday (see handout)  Call your doctor or nurse midwife if your baby is moving less than usual.     Call your provider if you notice:  Swelling in your face or increased swelling in your hands or legs.  Headaches that are not relieved by Tylenol (acetaminophen).  Changes in your vision (blurring: seeing spots or stars.)  Nausea (sick to your stomach) and vomiting (throwing up).   Weight gain of 5 pounds or more per week.  Heartburn that doesn't go away.  Signs of bladder infection: pain when you urinate (use the toilet), need to go more often and more urgently.  The bag of andres (rupture of membranes) breaks, or you notice leaking in your underwear.  Bright red blood in your underwear.  Abdominal (lower belly) or stomach pain.  Second (plus) baby: Contractions (tightening) less than 10 minutes apart and getting stronger.  Increase or change in vaginal discharge (note the color and amount)  Other: Call clinic with any other concerns    Follow-up:  As scheduled in the clinic: this week

## 2017-02-05 NOTE — PLAN OF CARE
Pt received dose of terb at 1511, prior to that dose she felt as though the contractions were stronger in intensity than upon arrival but not any more frequent. Currently feels as though the contractions are dissipating. In the past hour, pt has felt 3-4 contractions. Writer palpated 3 of these contractions, one palpated mild, the other 2 were un palpable and per pt were more mild. FHR tracings on baby babies have been reactive. Difficult to continuously monitor both babies at same time. Dr Guerrero paged to update, awaiting call back.

## 2017-02-05 NOTE — IP AVS SNAPSHOT
MRN:[id number]                      After Visit Summary   2/5/2017    Evelyn Miner    MRN: [id number]           Thank you!     Thank you for choosing [facility] for your care. Our goal is always to provide you with excellent care. Hearing back from our patients is one way we can continue to improve our services. Please take a few minutes to complete the written survey that you may receive in the mail after you visit. If you would like to speak to someone directly about your visit please contact Patient Relations at [phone]. Thank you!          Patient Information     Date Of Birth          1990        About your hospital stay     You were admitted on:  February 5, 2017 You last received care in the:  [facility] Labor and Delivery    You were discharged on:  February 5, 2017       Who to Call     For medical emergencies, please call 911.  For non-urgent questions about your medical care, please call your primary care provider or clinic, [phone]          Attending Provider     Provider    Leola Guerrero MD       Primary Care Provider Office Phone # Fax #    [place] [facility] [phone]       [facility] [address] [place] [address]        Your next 10 appointments already scheduled     Feb 10, 2017  3:30 PM   MFM BPP TWINS with RHMFMUSR3   [facility])    [address]  [facility] [address] [phone]            Feb 10, 2017  4:00 PM   Radiology MD with RH MFM MD   [facility] - [facility])    [address]  [facility] [address] [phone]           Please arrive at the time given for your first appointment.  This visit is used internally to schedule the physician's time during your ultrasound.            Feb 17, 2017 11:00 AM   MFM BPP TWINS with RHMFMUSR1    [facility] Maternal [facility]etal Medicine Ultrasound - [facility] ([facility])    [address]  [facility] [address]   [phone]            Feb 17, 2017 11:30 AM   Radiology MD with LISA PORTER MD   [facility] Maternal Fetal Medicine [facility] ([facility])    [address]  [facility] [address]   [phone]           Please arrive at the time given for your first appointment.  This visit is used internally to schedule the physician's time during your ultrasound.            Feb 24, 2017 11:00 AM   MFM US COMPRE TWINS F/U with RHMFMUSR2   [facility] Ultrasound - [facility] ([facility])    [address]  [facility] [address]   [phone]           Wear comfortable clothes and leave your valuables at home.            Feb 24, 2017 11:30 AM   Radiology MD with  CHARLOTTE ELIAS   [facility] Maternal Fetal Medicine [facility] ([facility])    [address]  [facility] [address]   [phone]           Please arrive at the time given for your first appointment.  This visit is used internally to schedule the physician's time during your ultrasound.            Mar 03, 2017  1:30 PM   MFM BPP TWINS with MFMUSR2   [facility] Medicine Ultrasound - [facility] ([facility])    [address]  [facility] [address]   [phone]            Mar 03, 2017  2:00 PM   Radiology MD with  CHARLOTTE ELIAS   [facility] Maternal Fetal Medicine [facility] ([facility])    [address]  [facility] [address]   [phone]           Please arrive at the time given for your first appointment.  This visit is used internally to schedule the physician's time during your ultrasound.            Mar 07, 2017   Procedure with Diya Quinones MD   [facility] Labor and Delivery (--)    [address]  [facility] [address]   [phone]             "  Further instructions from your care team       Discharge Instruction for Undelivered Patients      You were seen for: Labor Assessment  We Consulted: Dr Guerrero  You had (Test or Medicine):fetal and uterine monitoring, UA, FFN, IV fluids     Diet:   Drink 8 to 12 glasses of liquids (milk, juice, water) every day.  You may eat meals and snacks.     Activity:  Off of work until appt this week in clinic.  Count fetal kicks everyday (see handout)  Call your doctor or nurse midwife if your baby is moving less than usual.     Call your provider if you notice:  Swelling in your face or increased swelling in your hands or legs.  Headaches that are not relieved by Tylenol (acetaminophen).  Changes in your vision (blurring: seeing spots or stars.)  Nausea (sick to your stomach) and vomiting (throwing up).   Weight gain of 5 pounds or more per week.  Heartburn that doesn't go away.  Signs of bladder infection: pain when you urinate (use the toilet), need to go more often and more urgently.  The bag of andres (rupture of membranes) breaks, or you notice leaking in your underwear.  Bright red blood in your underwear.  Abdominal (lower belly) or stomach pain.  Second (plus) baby: Contractions (tightening) less than 10 minutes apart and getting stronger.  Increase or change in vaginal discharge (note the color and amount)  Other: Call clinic with any other concerns    Follow-up:  As scheduled in the clinic: this week          Pending Results     No orders found from 2/4/2017 to 2/6/2017.            Admission Information        Provider Department Dept Phone    2/5/2017 Leola Guerrero MD  Labor And Delivery [phone]      Your Vitals Were     Blood Pressure Temperature Respirations    131/73 mmHg 98  F (36.7  C) (Oral) 18    Height Weight BMI (Body Mass Index)    1.676 m (5' 6\") 104.781 kg (231 lb) 37.30 kg/m2    Last Period          06/10/2016        [employer] Information     [employer] gives you secure access to your " electronic health record. If you see a primary care provider, you can also send messages to your care team and make appointments. If you have questions, please call your primary care clinic.  If you do not have a primary care provider, please call [phone] and they will assist you.        Care EveryWhere ID     This is your Care EveryWhere ID. This could be used by other organizations to access your [place] medical records  [id number]           Review of your medicines      UNREVIEWED medicines. Ask your doctor about these medicines        Dose / Directions    MILK OF MAGNESIA PO        prn   Refills:  0       prenatal multivitamin  plus iron 27-0.8 MG Tabs per tablet        Dose:  1 tablet   Take 1 tablet by mouth daily   Refills:  0       sennosides 8.6 MG tablet   Commonly known as:  SENOKOT        Dose:  1 tablet   Take 1 tablet by mouth daily as needed   Refills:  0                Protect others around you: Learn how to safely use, store and throw away your medicines at www.disposemymeds.org.             Medication List: This is a list of all your medications and when to take them. Check marks below indicate your daily home schedule. Keep this list as a reference.      Medications           Morning Afternoon Evening Bedtime As Needed    MILK OF MAGNESIA PO   prn                                prenatal multivitamin  plus iron 27-0.8 MG Tabs per tablet   Take 1 tablet by mouth daily                                sennosides 8.6 MG tablet   Commonly known as:  SENOKOT   Take 1 tablet by mouth daily as needed

## 2017-02-05 NOTE — PROVIDER NOTIFICATION
02/05/17 1515   FHR Fetus B   Monitor (Fetus B) External US   Variability (Fetus B) 6-25 BPM   Baseline Rate (Fetus B) 135 BPM   Baseline Classification (Fetus B) Normal   Accelerations (Fetus B) Present   Decelerations (Fetus B) None   both infants difficult to trace continually attempting to get a continuous tracing

## 2017-02-05 NOTE — IP AVS SNAPSHOT
[facility] Labor and Delivery    [address]    [facility] [address]    Phone:  [phone]    Fax:  [phone]                                       After Visit Summary   2/5/2017    Evelyn Miner    MRN: [id number]           After Visit Summary Signature Page     I have received my discharge instructions, and my questions have been answered. I have discussed any challenges I see with this plan with the nurse or doctor.    ..........................................................................................................................................  Patient/Patient Representative Signature      ..........................................................................................................................................  Patient Representative Print Name and Relationship to Patient    ..................................................               ................................................  Date                                            Time    ..........................................................................................................................................  Reviewed by Signature/Title    ...................................................              ..............................................  Date                                                            Time

## 2017-02-05 NOTE — PROVIDER NOTIFICATION
17 1409   Provider Notification   Provider Name/Title Dr. Guerrero   Method of Notification Phone   Request Evaluate - Remote   Notification Reason Patient Arrived;SVE;Uterine Activity;Labor Status;Membrane Status   Patient   Presented with twin gestation with c/o Contractions today.  Assessment complete and VSS.  Shirin Martin RN at bedside upon entering room and admission intake in process.  Monitors applied by Shirin Martin RN and adjusted by myself.  Infants very active and the tracing is patchy but with the tracing that I get I have Category 1 for both girls.  Order for Dr. Guerrero to send a UA and do a FFN and then an SVE.  Patient denies anything vaginally in the last 24 hours.  FFN collected and sent and SVE done with report to Dr. Guerrero who had called in.  Plan is for IV fluid bolus of 500cc and if contractions do not subside a 0.25 mg SQ of Terbutaline is to be given PRN x1

## 2017-02-05 NOTE — PLAN OF CARE
Data: [facility] for r/o PTL at 34w2d.  Cervical exam closed.  Membranes intact.  Contractions occ, irregular.  Action:  Presumed adequate fetal oxygenation documented (see flow record). Discharge instructions reviewed.  Patient instructed to report change in fetal movement, vaginal leaking of fluid or bleeding, abdominal pain, or any concerns related to the pregnancy to her nurse/physician.   Response: Orders to discharge home per Dr Guerrero.  Patient verbalized understanding of education and verbalized agreement with plan.

## 2017-02-10 ENCOUNTER — HOSPITAL ENCOUNTER (OUTPATIENT)
Dept: ULTRASOUND IMAGING | Facility: CLINIC | Age: 27
Discharge: HOME | End: 2017-02-10
Attending: OBSTETRICS & GYNECOLOGY | Admitting: OBSTETRICS & GYNECOLOGY
Payer: COMMERCIAL

## 2017-02-10 ENCOUNTER — OFFICE VISIT (OUTPATIENT)
Dept: MATERNAL FETAL MEDICINE | Facility: CLINIC | Age: 27
End: 2017-02-10
Attending: OBSTETRICS & GYNECOLOGY
Payer: COMMERCIAL

## 2017-02-10 DIAGNOSIS — O30.042 DICHORIONIC DIAMNIOTIC TWIN PREGNANCY IN SECOND TRIMESTER: ICD-10-CM

## 2017-02-10 DIAGNOSIS — O30.043 DICHORIONIC DIAMNIOTIC TWIN PREGNANCY IN THIRD TRIMESTER: Primary | ICD-10-CM

## 2017-02-10 PROCEDURE — 76819 FETAL BIOPHYS PROFIL W/O NST: CPT

## 2017-02-17 ENCOUNTER — HOSPITAL ENCOUNTER (OUTPATIENT)
Facility: CLINIC | Age: 27
Discharge: HOME | End: 2017-02-17
Attending: OBSTETRICS & GYNECOLOGY | Admitting: OBSTETRICS & GYNECOLOGY
Payer: COMMERCIAL

## 2017-02-17 ENCOUNTER — OFFICE VISIT (OUTPATIENT)
Dept: MATERNAL FETAL MEDICINE | Facility: CLINIC | Age: 27
End: 2017-02-17
Attending: OBSTETRICS & GYNECOLOGY
Payer: COMMERCIAL

## 2017-02-17 ENCOUNTER — HOSPITAL ENCOUNTER (OUTPATIENT)
Dept: ULTRASOUND IMAGING | Facility: CLINIC | Age: 27
Discharge: HOME | End: 2017-02-17
Attending: OBSTETRICS & GYNECOLOGY | Admitting: OBSTETRICS & GYNECOLOGY
Payer: COMMERCIAL

## 2017-02-17 VITALS — DIASTOLIC BLOOD PRESSURE: 67 MMHG | SYSTOLIC BLOOD PRESSURE: 120 MMHG | TEMPERATURE: 98.6 F

## 2017-02-17 DIAGNOSIS — O30.042 DICHORIONIC DIAMNIOTIC TWIN PREGNANCY IN SECOND TRIMESTER: ICD-10-CM

## 2017-02-17 DIAGNOSIS — O30.043 DICHORIONIC DIAMNIOTIC TWIN PREGNANCY IN THIRD TRIMESTER: ICD-10-CM

## 2017-02-17 DIAGNOSIS — O36.8390 FETAL TACHYCARDIA AFFECTING MANAGEMENT OF MOTHER: Primary | ICD-10-CM

## 2017-02-17 PROCEDURE — 59025 FETAL NON-STRESS TEST: CPT | Mod: 59,ZF | Performed by: OBSTETRICS & GYNECOLOGY

## 2017-02-17 PROCEDURE — 76819 FETAL BIOPHYS PROFIL W/O NST: CPT

## 2017-02-17 PROCEDURE — 59025 FETAL NON-STRESS TEST: CPT | Mod: ZF | Performed by: OBSTETRICS & GYNECOLOGY

## 2017-02-17 RX ORDER — CEFAZOLIN SODIUM 2 G/100ML
2 INJECTION, SOLUTION INTRAVENOUS
Status: CANCELLED | OUTPATIENT
Start: 2017-02-17

## 2017-02-17 RX ORDER — CEFAZOLIN SODIUM 1 G/3ML
1 INJECTION, POWDER, FOR SOLUTION INTRAMUSCULAR; INTRAVENOUS
Status: CANCELLED | OUTPATIENT
Start: 2017-02-17

## 2017-02-17 RX ORDER — SODIUM CHLORIDE, SODIUM LACTATE, POTASSIUM CHLORIDE, CALCIUM CHLORIDE 600; 310; 30; 20 MG/100ML; MG/100ML; MG/100ML; MG/100ML
INJECTION, SOLUTION INTRAVENOUS CONTINUOUS
Status: CANCELLED | OUTPATIENT
Start: 2017-02-17

## 2017-02-17 NOTE — PROVIDER NOTIFICATION
17 1301   Provider Notification   Provider Name/Title Dr Quinones   Method of Notification Phone   Notification Reason Patient Arrived;Status Update       MD notified of  arrival from [facility] @ 36wks for tachycardic baby A and maternal contractions-no further complaints, since arrival, baby A 150s w/ accels and baby B 140s with accels, both category A strips, per patient, contractions have diminished since arriving here from [facility] and toco only picking up occasional contractions, denies bleeding or leaking of fluid, health history obtained, per [facility]-baby A and baby B are both vertex today, per MD-continue to monitor and Dr Mendes will be coming to the unit to see another patient and can observe and discuss this patient further at this time and plan to get patient to her 1430 appt with Dr Quinones as scheduled.

## 2017-02-17 NOTE — PROVIDER NOTIFICATION
17 1301   Provider Notification   Provider Name/Title Dr Quinones   Method of Notification Phone   Notification Reason Patient Arrived;Status Update     MD notified of  arrival from [facility] @ 36wks for tachycardic baby A and maternal contractions-no further complaints, since arrival, baby A 150s w/ accels and baby B 140s with accels, both category A strips, per patient, contractions have diminished since arriving here from [facility] and toco only picking up occasional contractions, denies bleeding or leaking of fluid, health history obtained, per [facility]-baby A and baby B are both vertex today, per MD-continue to monitor and Dr Mendes will be coming to the unit to see another patient and can observe and discuss this patient further at this time and plan to get patient to her 1430 appt with Dr Quinones as scheduled.

## 2017-02-17 NOTE — MR AVS SNAPSHOT
After Visit Summary   2/17/2017    Evelyn Miner    MRN: [id number]           Patient Information     Date Of Birth          1990        Visit Information        Provider Department      2/17/2017 11:30 AM Tyler Cordova MD [facility] Maternal Fetal Medicine [facility]        Today's Diagnoses     Fetal tachycardia affecting management of mother    -  1    Dichorionic diamniotic twin pregnancy in third trimester           Follow-ups after your visit        Your next 10 appointments already scheduled     Feb 24, 2017 11:00 AM CST   MFM US COMPRE TWINS F/U with RHMFMUSR2   [facility] Maternal Fetal Medicine Ultrasound - [facility] ([facility])    [address]  [facility] [address]   [phone]           Wear comfortable clothes and leave your valuables at home.            Feb 24, 2017 11:30 AM CST   Radiology MD with  MFM MD   [facility] Maternal Fetal Medicine [facility] ([facility])    [address]  [facility] [address]   [phone]           Please arrive at the time given for your first appointment.  This visit is used internally to schedule the physician's time during your ultrasound.            Mar 03, 2017  1:30 PM CST   MFM BPP TWINS with RHMFMUSR2   [facility] Maternal Fetal Medicine Ultrasound - [facility] ([facility])    [address]  [facility] [address]   [phone]            Mar 03, 2017  2:00 PM CST   Radiology [facility]   [facility] Maternal Fetal Medicine [facility] ([facility])    [address]  [facility] [address]   [phone]           Please arrive at the time given for your first appointment.  This visit is used internally to schedule the physician's time during your ultrasound.            Mar 07, 2017   Procedure with Diya Quinones MD   [facility] Labor and Delivery (--)    [address]  [facility] [address]    [phone]              Who to contact     If you have questions or need follow up information about today's clinic visit or your schedule please contact [facility] MATERNAL FETAL MEDICINE [facility] directly at [phone].  Normal or non-critical lab and imaging results will be communicated to you by GINKGOTREEhart, letter or phone within 4 business days after the clinic has received the results. If you do not hear from us within 7 days, please contact the clinic through GINKGOTREEhart or phone. If you have a critical or abnormal lab result, we will notify you by phone as soon as possible.  Submit refill requests through opendorse or call your pharmacy and they will forward the refill request to us. Please allow 3 business days for your refill to be completed.          Additional Information About Your Visit        GINKGOTREEharBeatsy Information     opendorse gives you secure access to your electronic health record. If you see a primary care provider, you can also send messages to your care team and make appointments. If you have questions, please call your primary care clinic.  If you do not have a primary care provider, please call [phone] and they will assist you.        Care EveryWhere ID     This is your Care EveryWhere ID. This could be used by other organizations to access your [facility] medical records  [id number]        Your Vitals Were     Last Period                   06/10/2016            Blood Pressure from Last 3 Encounters:   02/05/17 131/73   02/02/17 116/68   01/29/17 110/72    Weight from Last 3 Encounters:   02/05/17 104.8 kg (231 lb)   01/06/17 100.3 kg (221 lb 3.2 oz)   08/23/16 83.5 kg (184 lb)              We Performed the Following     FETAL NON-STRESS TEST     HC INITIAL FETAL NON-STRESS TEST, EA ADDL BABY        Primary Care Provider Office Phone # Fax #    Nicolette Hilary [facility] [phone]       [facility] [address] [place] [address]        Thank you!     Thank you for  choosing [facility]  for your care. Our goal is always to provide you with excellent care. Hearing back from our patients is one way we can continue to improve our services. Please take a few minutes to complete the written survey that you may receive in the mail after your visit with us. Thank you!             Your Updated Medication List - Protect others around you: Learn how to safely use, store and throw away your medicines at www.disposemymeds.org.          This list is accurate as of: 2/17/17 12:29 PM.  Always use your most recent med list.                   Brand Name Dispense Instructions for use    MILK OF MAGNESIA PO      prn       prenatal multivitamin  plus iron 27-0.8 MG Tabs per tablet      Take 1 tablet by mouth daily       sennosides 8.6 MG tablet    SENOKOT     Take 1 tablet by mouth daily as needed

## 2017-02-17 NOTE — PROGRESS NOTES
"US Note    Normal PASHA in both twins. BPP's are reassuring. Reactive NST x 2. Fetal tachycardia for fetus 1.    Given the persistent fetal tachycardia in fetus 1 and regular contractions we recommend an assessment in the Birthplace.     Please see \"Imaging\" tab under \"Chart Review\" for details of today's US at the [facility].    Tyler Cordova MD  Maternal-Fetal Medicine    "

## 2017-02-17 NOTE — DISCHARGE INSTRUCTIONS
Discharge Instruction for Undelivered Patients      You were seen for: Labor Assessment, Fetal assessment  We Consulted: Dr Mendes and Dr Quinones  You had (Test or Medicine): NST, SVE, and bedside ultrasound      Diet:   Drink 8 to 12 glasses of liquids (milk, juice, water) every day.  You may eat meals and snacks.     Activity:  Count fetal kicks everyday (see handout)  Call your doctor or nurse midwife if your baby is moving less than usual.     Call your provider if you notice:  Swelling in your face or increased swelling in your hands or legs.  Headaches that are not relieved by Tylenol (acetaminophen).  Changes in your vision (blurring: seeing spots or stars.)  Nausea (sick to your stomach) and vomiting (throwing up).   Weight gain of 5 pounds or more per week.  Heartburn that doesn't go away.  Signs of bladder infection: pain when you urinate (use the toilet), need to go more often and more urgently.  The bag of andres (rupture of membranes) breaks, or you notice leaking in your underwear.  Bright red blood in your underwear.  Abdominal (lower belly) or stomach pain.  For first baby: Contractions (tightening) less than 5 minutes apart for one hour or more.  Second (plus) baby: Contractions (tightening) less than 10 minutes apart and getting stronger.  *If less than 34 weeks: Contractions (tightenings) more than 6 times in one hour.  Increase or change in vaginal discharge (note the color and amount)    Follow-up:  Make an appointment to be seen on EARLY NEXT WEEK

## 2017-02-17 NOTE — PLAN OF CARE
MD notified of  arrival from [facility] @ 36wks for tachycardic baby A and maternal contractions-no further complaints, since arrival, baby A 150s w/ accels and baby B 140s with accels, both category A strips, per patient, contractions have diminished since arriving here from [facility] and toco only picking up occasional contractions, denies bleeding or leaking of fluid, health history obtained, per [facility]-baby A and baby B are both vertex today, per MD-continue to monitor and Dr Mendes will be coming to the unit to see another patient and can observe and discuss this patient further at this time and plan to get patient to her 1430 appt with Dr Quinones as scheduled.

## 2017-02-17 NOTE — PROVIDER NOTIFICATION
17 1542   Provider Notification   Provider Name/Title Dr Mendes   Method of Notification Phone   Notification Reason Status Update   Per MD, contractions every 3-5minutes, not worsening but patient does feel them 5-6/10 on pain scale, better after emptying bladder, baby A and B both category 1 strips, per MD, check cervix and send home if still unchanged with labor precautions.  *Patient today thinks she would opt for c/section as patient does not want to have vaginal delivery followed by  section. MD informed of this and understands and is agreeable but will not perform  section until in labor.

## 2017-02-17 NOTE — PROVIDER NOTIFICATION
02/17/17 1436   Provider Notification   Provider Name/Title Dr Mendes   Method of Notification In Department   Notification Reason SVE;Status Update   notified of cervical exam, per MD, monitor for 1 hour and update MD at that time.

## 2017-02-17 NOTE — IP AVS SNAPSHOT
MRN:[id number]                      After Visit Summary   2/17/2017    Evelyn Miner    MRN: [id number]           Thank you!     Thank you for choosing [facility] for your care. Our goal is always to provide you with excellent care. Hearing back from our patients is one way we can continue to improve our services. Please take a few minutes to complete the written survey that you may receive in the mail after you visit. If you would like to speak to someone directly about your visit please contact Patient Relations at [phone]. Thank you!          Patient Information     Date Of Birth          1990        About your hospital stay     You were admitted on:  February 17, 2017 You last received care in the:  [facility] Labor and Delivery    You were discharged on:  February 17, 2017       Who to Call     For medical emergencies, please call 911.  For non-urgent questions about your medical care, please call your primary care provider or clinic, [phone]          Attending Provider     Provider Specialty    Mima Mendes MD OB/Gyn    Diya Quinones MD OB/Gyn       Primary Care Provider Office Phone # Fax #    Nicolette Richard [facility] [phone]       [facility] [address] [place] [address]        Your next 10 appointments already scheduled     Feb 24, 2017 11:00 AM LESLIE PORTER US COMPRE TWINS F/U with MFMUSR2   [facility] Maternal Fetal Medicine Ultrasound - [facility])    [address]  [facility] [address] [phone]           Wear comfortable clothes and leave your valuables at home.            Feb 24, 2017 11:30 AM CST   Radiology MD with  CHARLOTTE ELIAS   [facility] Maternal Fetal Medicine - [facility])    [address]  [facility] [address] [phone]           Please arrive at the time given for your first appointment.  This visit is used  internally to schedule the physician's time during your ultrasound.            Mar 03, 2017  1:30 PM CST   MFM BPP TWINS with RHMFMUSR2   [facility] Maternal Fetal Medicine Ultrasound - [facility])    [address] [facility] [address]  [facility] [id number] [phone]            Mar 03, 2017  2:00 PM CST   Radiology MD with RH MFM MD   [facility] Maternal Fetal Medicine - [facility])    [address] [facility] [address]  [facility] [address] [phone]           Please arrive at the time given for your first appointment.  This visit is used internally to schedule the physician's time during your ultrasound.            Mar 07, 2017   Procedure with Diya Quinones MD   [facility] Labor and Delivery (--)    [address]  [facility] [id number] [phone]              Further instructions from your care team       Discharge Instruction for Undelivered Patients      You were seen for: Labor Assessment, Fetal assessment  We Consulted: Dr Mendes and Dr Quinones  You had (Test or Medicine): NST, SVE, and bedside ultrasound      Diet:   Drink 8 to 12 glasses of liquids (milk, juice, water) every day.  You may eat meals and snacks.     Activity:  Count fetal kicks everyday (see handout)  Call your doctor or nurse midwife if your baby is moving less than usual.     Call your provider if you notice:  Swelling in your face or increased swelling in your hands or legs.  Headaches that are not relieved by Tylenol (acetaminophen).  Changes in your vision (blurring: seeing spots or stars.)  Nausea (sick to your stomach) and vomiting (throwing up).   Weight gain of 5 pounds or more per week.  Heartburn that doesn't go away.  Signs of bladder infection: pain when you urinate (use the toilet), need to go more often and more urgently.  The bag of andres (rupture of membranes) breaks, or you notice leaking in your underwear.  Bright red blood in your underwear.  Abdominal  (lower belly) or stomach pain.  For first baby: Contractions (tightening) less than 5 minutes apart for one hour or more.  Second (plus) baby: Contractions (tightening) less than 10 minutes apart and getting stronger.  *If less than 34 weeks: Contractions (tightenings) more than 6 times in one hour.  Increase or change in vaginal discharge (note the color and amount)    Follow-up:  Make an appointment to be seen on EARLY NEXT WEEK          Pending Results     No orders found from 2/15/2017 to 2/18/2017.            Admission Information     Date & Time Provider Department Dept. Phone    2/17/2017 Diya Quinones MD [facility] Labor and Delivery [phone]      Your Vitals Were     Blood Pressure Temperature Last Period             120/67 98.6  F (37  C) (Oral) 06/10/2016         MyChart Information     Contractually gives you secure access to your electronic health record. If you see a primary care provider, you can also send messages to your care team and make appointments. If you have questions, please call your primary care clinic.  If you do not have a primary care provider, please call [phone] and they will assist you.        Care EveryWhere ID     This is your Care EveryWhere ID. This could be used by other organizations to access your [facility] medical records  [id number]           Review of your medicines      UNREVIEWED medicines. Ask your doctor about these medicines        Dose / Directions    MILK OF MAGNESIA PO        prn   Refills:  0       prenatal multivitamin  plus iron 27-0.8 MG Tabs per tablet        Dose:  1 tablet   Take 1 tablet by mouth daily   Refills:  0       sennosides 8.6 MG tablet   Commonly known as:  SENOKOT        Dose:  1 tablet   Take 1 tablet by mouth daily as needed   Refills:  0                Protect others around you: Learn how to safely use, store and throw away your medicines at www.disposemymeds.org.             Medication List: This is a list of all your medications  and when to take them. Check marks below indicate your daily home schedule. Keep this list as a reference.      Medications           Morning Afternoon Evening Bedtime As Needed    MILK OF MAGNESIA PO   prn                                prenatal multivitamin  plus iron 27-0.8 MG Tabs per tablet   Take 1 tablet by mouth daily                                sennosides 8.6 MG tablet   Commonly known as:  SENOKOT   Take 1 tablet by mouth daily as needed

## 2017-02-17 NOTE — NURSING NOTE
NST Performed due to  Alvin twins, twin A fetal tachycardia.  Dr. Cordova reviewed efm tracing. See NST/BPP Doc Flowsheet tab.  BPP 8/8 x 2. Dr Cordova spoke with Dr Quinones and plan to monitor and eval for labor at [facility] L& D. Pt left amb in good condition.

## 2017-02-17 NOTE — PROVIDER NOTIFICATION
02/17/17 1430   Provider Notification   Provider Name/Title Dr Mendes   Method of Notification In Department   MD in department and updated on patient, patient reports more painful contractions, order to do SVE and continue monitoring for now, will not be going to appointment right now.

## 2017-02-17 NOTE — IP AVS SNAPSHOT
[facility] Labor and Delivery    [address]    [facility] [address]    Phone:  [phone]    Fax:  [phone]                                       After Visit Summary   2/17/2017    Evelyn Miner    MRN: [id number]           After Visit Summary Signature Page     I have received my discharge instructions, and my questions have been answered. I have discussed any challenges I see with this plan with the nurse or doctor.    ..........................................................................................................................................  Patient/Patient Representative Signature      ..........................................................................................................................................  Patient Representative Print Name and Relationship to Patient    ..................................................               ................................................  Date                                            Time    ..........................................................................................................................................  Reviewed by Signature/Title    ...................................................              ..............................................  Date                                                            Time

## 2017-02-18 LAB
GP B STREP DNA SPEC QL NAA+PROBE: NORMAL
SPECIMEN SOURCE: NORMAL

## 2017-02-18 NOTE — PROVIDER NOTIFICATION
02/17/17 1819   Provider Notification   Provider Name/Title Dr Quinones   Method of Notification At Bedside   Request Evaluate in Person     Dr Quinones at bedside to evaluate pt. Ultrasound performed, babies vertex vertex. SVE unchanged from previous exam. Discussed that because pt is not in active labor at the time she can choose to stay here and be monitored longer or go home and see if contraction pattern changes at all or if water breaks. Pt chooses to discharge home. GBS collected per Dr Quinones request.

## 2017-02-18 NOTE — PROGRESS NOTES
TRIAGE NOTE    S: Patient is a 27 yo  at 36+0 weeks with Di-Di twins who presented to L&D from [facility] due to fetal tachycardia and ctx.  Both babies with reactive NSTs on L&D.  Patient continues to have ctx approx every 5-6 min all day.  Cervix was closed on admit at 230 pm.  Was then rechecked at 340 pm and was 1 cm per a different RN.  Patient denies LOF or VB.  States ctx unchanged all day, patient appears comfortable.    O: /67  Temp 98.6  F (37  C) (Oral)  LMP 06/10/2016   Gen - NAD  abd - gravid, soft, NT  EFM: A - , moderate variability, + accels, no decels  B - , moderate variability, + accels, no decels    toco - ctx q 5 min  Cervix - 1/50/-4 (my exam - 2.5 hours after last exam)  Bedside U/S - vertex/vertex    A/P: 27 yo  at 36 weeks with di-di twins  1. Vertex/vertex now - had been br/br and  planned but today vtx/vtx.  Discussed mode of delivery in detail and patient elects for vaginal delivery if remain vertex/vertex.  2. Contractions - patient initially changed cervix with 2 different examiners but now has remained 1 cm and high for last 2.5 hours.  Contractions occurring but patient doesn't appear uncomfortable.  Offered D/C home with return if ctx increase or SROM vs. Further observation.  Patient feels comfortable with D/C and I agree.    3. Off work this week due to extreme discomfort while on feet at job and increased ctx.  4. Return to clinic next week if undelivered.

## 2017-02-18 NOTE — PLAN OF CARE
Discharge instructions reviewed with pt. Verbalizes understanding and declines further questions. Will follow up in clinic as scheduled. Ambulate off unit at 1841 undelivered.

## 2017-02-23 ENCOUNTER — ANESTHESIA (OUTPATIENT)
Dept: OBGYN | Facility: CLINIC | Age: 27
End: 2017-02-23
Payer: COMMERCIAL

## 2017-02-23 ENCOUNTER — ANESTHESIA EVENT (OUTPATIENT)
Dept: OBGYN | Facility: CLINIC | Age: 27
End: 2017-02-23
Payer: COMMERCIAL

## 2017-02-23 ENCOUNTER — HOSPITAL ENCOUNTER (INPATIENT)
Facility: CLINIC | Age: 27
LOS: 1 days | Discharge: HOME | End: 2017-02-24
Attending: OBSTETRICS & GYNECOLOGY | Admitting: OBSTETRICS & GYNECOLOGY
Payer: COMMERCIAL

## 2017-02-23 LAB
ABO + RH BLD: NORMAL
ABO + RH BLD: NORMAL
BLD GP AB SCN SERPL QL: NORMAL
BLOOD BANK CMNT PATIENT-IMP: NORMAL
HGB BLD-MCNC: 11.3 G/DL (ref 11.7–15.7)
SPECIMEN EXP DATE BLD: NORMAL
T PALLIDUM IGG+IGM SER QL: NEGATIVE

## 2017-02-23 PROCEDURE — 37000011 ZZH ANESTHESIA WARD SERVICE

## 2017-02-23 PROCEDURE — 3E0R3CZ INTRODUCTION OF REGIONAL ANESTHETIC INTO SPINAL CANAL, PERCUTANEOUS APPROACH: ICD-10-PCS | Performed by: OBSTETRICS & GYNECOLOGY

## 2017-02-23 PROCEDURE — 86900 BLOOD TYPING SEROLOGIC ABO: CPT | Performed by: OBSTETRICS & GYNECOLOGY

## 2017-02-23 PROCEDURE — 86850 RBC ANTIBODY SCREEN: CPT | Performed by: OBSTETRICS & GYNECOLOGY

## 2017-02-23 PROCEDURE — 25000132 ZZH RX MED GY IP 250 OP 250 PS 637: Performed by: OBSTETRICS & GYNECOLOGY

## 2017-02-23 PROCEDURE — 25000128 H RX IP 250 OP 636

## 2017-02-23 PROCEDURE — 85018 HEMOGLOBIN: CPT | Performed by: OBSTETRICS & GYNECOLOGY

## 2017-02-23 PROCEDURE — 12000029 ZZH R&B OB INTERMEDIATE

## 2017-02-23 PROCEDURE — 25000125 ZZHC RX 250: Performed by: ANESTHESIOLOGY

## 2017-02-23 PROCEDURE — 86780 TREPONEMA PALLIDUM: CPT | Performed by: OBSTETRICS & GYNECOLOGY

## 2017-02-23 PROCEDURE — 86901 BLOOD TYPING SEROLOGIC RH(D): CPT | Performed by: OBSTETRICS & GYNECOLOGY

## 2017-02-23 PROCEDURE — 40000671 ZZH STATISTIC ANESTHESIA CASE

## 2017-02-23 PROCEDURE — 25800025 ZZH RX 258: Performed by: OBSTETRICS & GYNECOLOGY

## 2017-02-23 PROCEDURE — 25000125 ZZHC RX 250: Performed by: OBSTETRICS & GYNECOLOGY

## 2017-02-23 PROCEDURE — 00HU33Z INSERTION OF INFUSION DEVICE INTO SPINAL CANAL, PERCUTANEOUS APPROACH: ICD-10-PCS | Performed by: OBSTETRICS & GYNECOLOGY

## 2017-02-23 RX ORDER — OXYCODONE AND ACETAMINOPHEN 5; 325 MG/1; MG/1
1 TABLET ORAL
Status: DISCONTINUED | OUTPATIENT
Start: 2017-02-23 | End: 2017-02-24 | Stop reason: HOSPADM

## 2017-02-23 RX ORDER — OXYTOCIN 10 [USP'U]/ML
10 INJECTION, SOLUTION INTRAMUSCULAR; INTRAVENOUS
Status: DISCONTINUED | OUTPATIENT
Start: 2017-02-23 | End: 2017-02-24 | Stop reason: HOSPADM

## 2017-02-23 RX ORDER — SODIUM CHLORIDE, SODIUM LACTATE, POTASSIUM CHLORIDE, CALCIUM CHLORIDE 600; 310; 30; 20 MG/100ML; MG/100ML; MG/100ML; MG/100ML
INJECTION, SOLUTION INTRAVENOUS CONTINUOUS
Status: DISCONTINUED | OUTPATIENT
Start: 2017-02-23 | End: 2017-02-24 | Stop reason: HOSPADM

## 2017-02-23 RX ORDER — ACETAMINOPHEN 325 MG/1
650 TABLET ORAL EVERY 4 HOURS PRN
Status: DISCONTINUED | OUTPATIENT
Start: 2017-02-23 | End: 2017-02-24 | Stop reason: HOSPADM

## 2017-02-23 RX ORDER — IBUPROFEN 800 MG/1
800 TABLET, FILM COATED ORAL
Status: DISCONTINUED | OUTPATIENT
Start: 2017-02-23 | End: 2017-02-24 | Stop reason: HOSPADM

## 2017-02-23 RX ORDER — ONDANSETRON 2 MG/ML
4 INJECTION INTRAMUSCULAR; INTRAVENOUS EVERY 6 HOURS PRN
Status: DISCONTINUED | OUTPATIENT
Start: 2017-02-23 | End: 2017-02-23

## 2017-02-23 RX ORDER — OXYTOCIN/0.9 % SODIUM CHLORIDE 30/500 ML
100-340 PLASTIC BAG, INJECTION (ML) INTRAVENOUS CONTINUOUS PRN
Status: DISCONTINUED | OUTPATIENT
Start: 2017-02-23 | End: 2017-02-24 | Stop reason: HOSPADM

## 2017-02-23 RX ORDER — FENTANYL CITRATE 50 UG/ML
50-100 INJECTION, SOLUTION INTRAMUSCULAR; INTRAVENOUS
Status: DISCONTINUED | OUTPATIENT
Start: 2017-02-23 | End: 2017-02-24 | Stop reason: HOSPADM

## 2017-02-23 RX ORDER — BUPIVACAINE HYDROCHLORIDE 2.5 MG/ML
INJECTION, SOLUTION EPIDURAL; INFILTRATION; INTRACAUDAL PRN
Status: DISCONTINUED | OUTPATIENT
Start: 2017-02-23 | End: 2017-02-23

## 2017-02-23 RX ORDER — ONDANSETRON 2 MG/ML
4 INJECTION INTRAMUSCULAR; INTRAVENOUS EVERY 6 HOURS PRN
Status: DISCONTINUED | OUTPATIENT
Start: 2017-02-23 | End: 2017-02-24 | Stop reason: HOSPADM

## 2017-02-23 RX ORDER — OXYTOCIN/0.9 % SODIUM CHLORIDE 30/500 ML
1-24 PLASTIC BAG, INJECTION (ML) INTRAVENOUS CONTINUOUS
Status: DISCONTINUED | OUTPATIENT
Start: 2017-02-23 | End: 2017-02-23 | Stop reason: CLARIF

## 2017-02-23 RX ORDER — NALOXONE HYDROCHLORIDE 0.4 MG/ML
.1-.4 INJECTION, SOLUTION INTRAMUSCULAR; INTRAVENOUS; SUBCUTANEOUS
Status: DISCONTINUED | OUTPATIENT
Start: 2017-02-23 | End: 2017-02-24 | Stop reason: HOSPADM

## 2017-02-23 RX ORDER — NALBUPHINE HYDROCHLORIDE 10 MG/ML
2.5-5 INJECTION, SOLUTION INTRAMUSCULAR; INTRAVENOUS; SUBCUTANEOUS EVERY 6 HOURS PRN
Status: DISCONTINUED | OUTPATIENT
Start: 2017-02-23 | End: 2017-02-24 | Stop reason: HOSPADM

## 2017-02-23 RX ORDER — METHYLERGONOVINE MALEATE 0.2 MG/ML
200 INJECTION INTRAVENOUS
Status: DISCONTINUED | OUTPATIENT
Start: 2017-02-23 | End: 2017-02-24 | Stop reason: HOSPADM

## 2017-02-23 RX ORDER — FENTANYL CITRATE 50 UG/ML
100 INJECTION, SOLUTION INTRAMUSCULAR; INTRAVENOUS ONCE
Status: DISCONTINUED | OUTPATIENT
Start: 2017-02-23 | End: 2017-02-24 | Stop reason: HOSPADM

## 2017-02-23 RX ORDER — CALCIUM CARBONATE 500 MG/1
500-1000 TABLET, CHEWABLE ORAL
Status: DISCONTINUED | OUTPATIENT
Start: 2017-02-23 | End: 2017-02-24 | Stop reason: HOSPADM

## 2017-02-23 RX ORDER — EPHEDRINE SULFATE 50 MG/ML
5 INJECTION, SOLUTION INTRAMUSCULAR; INTRAVENOUS; SUBCUTANEOUS
Status: DISCONTINUED | OUTPATIENT
Start: 2017-02-23 | End: 2017-02-24 | Stop reason: HOSPADM

## 2017-02-23 RX ORDER — CARBOPROST TROMETHAMINE 250 UG/ML
250 INJECTION, SOLUTION INTRAMUSCULAR
Status: DISCONTINUED | OUTPATIENT
Start: 2017-02-23 | End: 2017-02-24 | Stop reason: HOSPADM

## 2017-02-23 RX ADMIN — Medication 5 MG: at 16:06

## 2017-02-23 RX ADMIN — Medication 5 MG: at 13:51

## 2017-02-23 RX ADMIN — OXYTOCIN-SODIUM CHLORIDE 0.9% IV SOLN 30 UNIT/500ML 2 MILLI-UNITS/MIN: 30-0.9/5 SOLUTION at 15:57

## 2017-02-23 RX ADMIN — SODIUM CHLORIDE, POTASSIUM CHLORIDE, SODIUM LACTATE AND CALCIUM CHLORIDE: 600; 310; 30; 20 INJECTION, SOLUTION INTRAVENOUS at 14:17

## 2017-02-23 RX ADMIN — CALCIUM CARBONATE (ANTACID) CHEW TAB 500 MG 1000 MG: 500 CHEW TAB at 15:38

## 2017-02-23 RX ADMIN — SODIUM CHLORIDE, POTASSIUM CHLORIDE, SODIUM LACTATE AND CALCIUM CHLORIDE: 600; 310; 30; 20 INJECTION, SOLUTION INTRAVENOUS at 12:29

## 2017-02-23 RX ADMIN — SODIUM CHLORIDE, POTASSIUM CHLORIDE, SODIUM LACTATE AND CALCIUM CHLORIDE 500 ML: 600; 310; 30; 20 INJECTION, SOLUTION INTRAVENOUS at 09:40

## 2017-02-23 RX ADMIN — SODIUM CHLORIDE, POTASSIUM CHLORIDE, SODIUM LACTATE AND CALCIUM CHLORIDE 500 ML: 600; 310; 30; 20 INJECTION, SOLUTION INTRAVENOUS at 10:00

## 2017-02-23 RX ADMIN — Medication 5 MG: at 15:46

## 2017-02-23 RX ADMIN — BUPIVACAINE HYDROCHLORIDE 10 ML: 2.5 INJECTION, SOLUTION EPIDURAL; INFILTRATION; INTRACAUDAL at 13:17

## 2017-02-23 RX ADMIN — Medication: at 13:13

## 2017-02-23 NOTE — IP AVS SNAPSHOT
[facility] Labor and Delivery    [address]    [facility] [address]    Phone:  [phone]    Fax:  [phone]                                       After Visit Summary   2/23/2017    Evelyn Miner    MRN: [id number]           After Visit Summary Signature Page     I have received my discharge instructions, and my questions have been answered. I have discussed any challenges I see with this plan with the nurse or doctor.    ..........................................................................................................................................  Patient/Patient Representative Signature      ..........................................................................................................................................  Patient Representative Print Name and Relationship to Patient    ..................................................               ................................................  Date                                            Time    ..........................................................................................................................................  Reviewed by Signature/Title    ...................................................              ..............................................  Date                                                            Time

## 2017-02-23 NOTE — ANESTHESIA PROCEDURE NOTES
Peripheral nerve/Neuraxial procedure note : epidural catheter  Pre-Procedure  Performed by NEGRO TIRADO  Referred by ERNESTO  Location:   Procedure Times:2/23/2017 1:04 PM and 2/23/2017 1:18 PM  Pre-Anesthestic Checklist: patient identified, IV checked, risks and benefits discussed, informed consent, monitors and equipment checked, pre-op evaluation and at physician/surgeon's request    Timeout  Correct Patient: Yes   Correct Procedure: Yes   Correct Site: Yes   Correct Laterality: N/A   Correct Position: Yes   Site Marked: N/A   .   Procedure Documentation    .    Procedure:    Epidural catheter.  Insertion Site:L3-4  (midline approach) Injection technique: LORT saline   Local skin infiltrated with mL of 1% lidocaine.  ANKITA at 5 cm     Patient Prep;mask, sterile gloves, povidone-iodine 7.5% surgical scrub, patient draped.  .  Needle: Touhy needle (17 G. 3.5 in). # of attempts: 1.  # of redirects: 1.  Spinal Needle: . . . Catheter: 19 G . .  Catheter threaded easily  6 cm epidural space.  11 cm at skin.   .    Assessment/Narrative  Paresthesias: No.  .  .  Aspiration negative for heme or CSF  . Test dose of mL lidocaine 1.5% w/ 1:200,000 epinephrine at 13:14. Test dose negative for signs of intravascular, subdural or intrathecal injection.

## 2017-02-23 NOTE — PROVIDER NOTIFICATION
02/23/17 0940   Provider Notification   Provider Name/Title Blas   Method of Notification At Bedside   Request Evaluate in Person   see note

## 2017-02-23 NOTE — PROVIDER NOTIFICATION
02/23/17 1126   Provider Notification   Provider Name/Title Blas   Method of Notification Electronic Page   Notification Reason Status Update

## 2017-02-23 NOTE — PLAN OF CARE
36.6 WK Pt and spouse present to labor and delivery for r/o labor with twins. Pt states ucs started at 0300 but was able to go back to sleep but then worsened this am and now rating pain 6/10.  Pt denies lof or bleeding and is feeling fetal movement of both babies.  Monitors explained and applied, health hx obtained.  All questions answered with nothing further at this time.      0915 Dr Odonnell updated re: pt arrival, [facility] every 2-3 min mod palpation, mod variab w/accels, sve 2.5/60/-3 was 1 in clinic pt rating pain 6/10. Ok to move pt to room and start iv for hydration and continue to monitor.    09 pt moved to room 404    0940 Dr Odonnell at [facility] for ultrasound and pt eval, both babies vertex.  Will continue to monitor.    0952 pt off monitor to walk.

## 2017-02-23 NOTE — PROVIDER NOTIFICATION
02/23/17 1718   Provider Notification   Provider Name/Title Dr. Jaramillo   Method of Notification Phone   Request Evaluate - Remote   Notification Reason Status Update   Md just wanted a quick update.  Would like RN to check pt's cervix in 2-3 hours and Dr. Jaramillo is about 20 minutes away.

## 2017-02-23 NOTE — PROVIDER NOTIFICATION
02/23/17 1649   Provider Notification   Provider Name/Title Dr. Odonnell   Method of Notification At Bedside   Request Evaluate in Person   Notification Reason SVE   Dr. Odonnell at bedside to check pt cervix. SVE was 3/50/-3.  Md is not going to break water at this time.  Will continue to increase the pitocin.  Dr. Jaramillo will be assuming pt care at 1700.

## 2017-02-23 NOTE — H&P
"OB Brief Admit H&P    No significant change in general health status based on examination of the patient, review of Nursing Admission Database and prenatal record.    Pt is a 26 year old  @ 36w6d who presented to L&D with regular contractions.    Patient's prenatal course has been significant for dichorionic/diamniotic twins. US 17 : BPP 8/8 x 2, vertex/vertex    Prenatal Labs:    Blood type A pos  Rubella Immune  [id number]  GBS neg    Bedside US: vertex/vertex    /71  Pulse 92  Temp 98.8  F (37.1  C) (Oral)  Resp 18  Ht 1.676 m (5' 6\")  Wt 105.7 kg (233 lb)  LMP 06/10/2016  BMI 37.61 kg/m2  EFM:  150/140 moderate variability, accelerations present, no decels x2  [place]: 3-5 min  SVE: 2.5/60/-3  Membranes:  intact    Labs:  Results for orders placed or performed during the hospital encounter of 17 (from the past 24 hour(s))   ABO/Rh type and screen   Result Value Ref Range    ABO A     RH(D)  Pos     Antibody Screen Neg     Test Valid Only At [facility]     Specimen Expires 2017    Hemoglobin   Result Value Ref Range    Hemoglobin 11.3 (L) 11.7 - 15.7 g/dL       Meds:    fentaNYL  100 mcg EPIDURAL Once       Assessment:  26 year old  @ 36w6d admitted for labor with di/di twins in vertex/vertex presentation. Patient would like to proceed with vaginal delivery and requests epidural when in more active labor    Plan:  1. Admit to labor and delivery   2. Epidural prn  3. Continuous fetal monitoring x2  4. Plan vaginal delivery under double set up.    Sandy England MD  2017  "

## 2017-02-23 NOTE — IP AVS SNAPSHOT
MRN:[id number]                      After Visit Summary   2/23/2017    Evelyn Miner    MRN: [id number]           Thank you!     Thank you for choosing [facility] for your care. Our goal is always to provide you with excellent care. Hearing back from our patients is one way we can continue to improve our services. Please take a few minutes to complete the written survey that you may receive in the mail after you visit. If you would like to speak to someone directly about your visit please contact Patient Relations at [phone]. Thank you!          Patient Information     Date Of Birth          1990        About your hospital stay     You were admitted on:  February 23, 2017 You last received care in the:  [facility] Labor and Delivery    You were discharged on:  February 24, 2017       Who to Call     For medical emergencies, please call 911.  For non-urgent questions about your medical care, please call your primary care provider or clinic, [phone]          Attending Provider     Provider Specialty    Sandy Small MD OB/Gyn    Clint, Daniela Chavez MD OB/Gyn       Primary Care Provider Office Phone # Fax #    Nicolette Richard [facility] [phone]       [facility] [address] [place] [address]        Your next 10 appointments already scheduled     Feb 24, 2017  8:45 AM CST   MFELDON US COMPRE TWINS F/U with MFMUSR2   [facility] - [facility])    [address]  [facility] [id number] [phone]           Wear comfortable clothes and leave your valuables at home.            Feb 24, 2017  9:15 AM CST   Radiology MD with  CHARLOTTE ELIAS   [facility])    [address]  [facility] [id number] [phone]           Please arrive at the time given for your first appointment.  This visit  is used internally to schedule the physician's time during your ultrasound.            Mar 03, 2017  1:30 PM CST   MFM BPP TWINS with RHMFMUSR2   [facility] Maternal Fetal Medicine Ultrasound - [facility])    [address] [facility] [address]  [facility] [address]   [phone]            Mar 03, 2017  2:00 PM CST   Radiology MD with RH MFM MD   [facility] Maternal Fetal Medicine - [facility])    [address] [facility] [address]  [facility] [id number] [phone]           Please arrive at the time given for your first appointment.  This visit is used internally to schedule the physician's time during your ultrasound.            Mar 07, 2017   Procedure with Diya Quinones MD   [facility] Labor and Delivery (--)    [address] [facility] [address] [phone]              Further instructions from your care team       Discharge Instruction for Undelivered Patients      You were seen for: Labor Assessment  We Consulted: Dr Jaramillo  You had (Test or Medicine):monitoring, epidural     Diet:   Drink 8 to 12 glasses of liquids (milk, juice, water) every day.  You may eat meals and snacks.     Activity:  Call your doctor or nurse midwife if your baby is moving less than usual.     Call your provider if you notice:  Swelling in your face or increased swelling in your hands or legs.  Headaches that are not relieved by Tylenol (acetaminophen).  Changes in your vision (blurring: seeing spots or stars.)  Nausea (sick to your stomach) and vomiting (throwing up).   Weight gain of 5 pounds or more per week.  Heartburn that doesn't go away.  Signs of bladder infection: pain when you urinate (use the toilet), need to go more often and more urgently.  The bag of andres (rupture of membranes) breaks, or you notice leaking in your underwear.  Bright red blood in your underwear.  Abdominal (lower belly) or stomach pain.  For first baby: Contractions (tightening) less  "than 5 minutes apart for one hour or more.  Second (plus) baby: Contractions (tightening) less than 10 minutes apart and getting stronger.  *If less than 34 weeks: Contractions (tightenings) more than 6 times in one hour.  Increase or change in vaginal discharge (note the color and amount)  Other:     Follow-up:  At [facility] at 0845, office at 1000          Pending Results     No orders found for last 3 day(s).            Admission Information     Date & Time Provider Department Dept. Phone    2/23/2017 Daniela Jaramillo MD [facility] Labor and Delivery [phone]      Your Vitals Were     Blood Pressure Pulse Temperature Respirations Height Weight    125/76 92 98.2  F (36.8  C) (Oral) 18 1.676 m (5' 6\") 105.7 kg (233 lb)    Last Period BMI (Body Mass Index)                06/10/2016 37.61 kg/m2          MyChart Information     [employer] gives you secure access to your electronic health record. If you see a primary care provider, you can also send messages to your care team and make appointments. If you have questions, please call your primary care clinic.  If you do not have a primary care provider, please call [phone] and they will assist you.        Care EveryWhere ID     This is your Care EveryWhere ID. This could be used by other organizations to access your [facility] medical records  [id number]           Review of your medicines      UNREVIEWED medicines. Ask your doctor about these medicines        Dose / Directions    MILK OF MAGNESIA PO        prn   Refills:  0       prenatal multivitamin  plus iron 27-0.8 MG Tabs per tablet        Dose:  1 tablet   Take 1 tablet by mouth daily   Refills:  0       sennosides 8.6 MG tablet   Commonly known as:  SENOKOT        Dose:  1 tablet   Take 1 tablet by mouth daily as needed   Refills:  0                Protect others around you: Learn how to safely use, store and throw away your medicines at www.disposemymeds.org.             Medication List: This is a " list of all your medications and when to take them. Check marks below indicate your daily home schedule. Keep this list as a reference.      Medications           Morning Afternoon Evening Bedtime As Needed    MILK OF MAGNESIA PO   prn                                prenatal multivitamin  plus iron 27-0.8 MG Tabs per tablet   Take 1 tablet by mouth daily                                sennosides 8.6 MG tablet   Commonly known as:  SENOKOT   Take 1 tablet by mouth daily as needed

## 2017-02-23 NOTE — PLAN OF CARE
[id number] pt received epidural and in side lying position difficult to monitor both babies. EFM audible with moderate variablity. Dr. Odonnell present at bedside at 1330 using ultrasound to assist efm placement. sve and orders received for pitocin augmentation.     1530 unable to start pitocin due to difficulty monitoring FHTs. Bedside report given to Nury LINK RN. Yamilexs handed over.

## 2017-02-23 NOTE — ANESTHESIA PREPROCEDURE EVALUATION
PAC NOTE:       ANESTHESIA PRE EVALUATION:  Anesthesia Evaluation       history and physical reviewed .      No history of anesthetic complications     ROS/MED HX    ENT/Pulmonary:  - neg pulmonary ROS     Neurologic:  - neg neurologic ROS     Cardiovascular:  - neg cardiovascular ROS       METS/Exercise Tolerance:     Hematologic:         Musculoskeletal:         GI/Hepatic:  - neg GI/hepatic ROS       Renal/Genitourinary:         Endo:         Psychiatric:         Infectious Disease:         Malignancy:         Other:               Physical Exam  Normal systems: cardiovascular, pulmonary and dental    Airway   Mallampati: II  TM distance: > 3 FB  Neck ROM: full  Mouth opening: > 3 cm    Dental     Cardiovascular       Pulmonary         neg OB ROS            Anesthesia Plan      History & Physical Review      ASA Status:  .  OB Epidural Asa: 2            Postoperative Care      Consents  Anesthetic plan, risks, benefits and alternatives discussed with:  Patient..                            .

## 2017-02-23 NOTE — PROVIDER NOTIFICATION
02/23/17 0915   Provider Notification   Provider Name/Title Dr Odonnell   Method of Notification Phone;Electronic Page

## 2017-02-23 NOTE — PROGRESS NOTES
Progress note    Patient had requested and epidural and placed around 1:15 PM per labor nurse report. However, nurse did not check patient prior to epidural placement and was not rechecked until my arriva to room after my surgery case at [facility] to see how patient was doing.  Patient with epidural in place. On my cervical exam at 1:45pm , patient was still 3cm, and presenting part high/ballotable. Bedtime US repeated and babies vertex/vertex. Unable to do AROM due to high presenting part. Her labor now is irregular contractions q 7-8 min.  Will augment now withpitocin and attempt AROM later when vertex is well applied.

## 2017-02-24 ENCOUNTER — HOSPITAL ENCOUNTER (INPATIENT)
Facility: CLINIC | Age: 27
End: 2017-02-24
Admitting: OBSTETRICS & GYNECOLOGY
Payer: COMMERCIAL

## 2017-02-24 ENCOUNTER — OFFICE VISIT (OUTPATIENT)
Dept: MATERNAL FETAL MEDICINE | Facility: CLINIC | Age: 27
End: 2017-02-24
Attending: OBSTETRICS & GYNECOLOGY
Payer: COMMERCIAL

## 2017-02-24 ENCOUNTER — HOSPITAL ENCOUNTER (INPATIENT)
Dept: ULTRASOUND IMAGING | Facility: CLINIC | Age: 27
End: 2017-02-24
Attending: OBSTETRICS & GYNECOLOGY
Payer: COMMERCIAL

## 2017-02-24 VITALS
TEMPERATURE: 98.2 F | HEIGHT: 66 IN | SYSTOLIC BLOOD PRESSURE: 125 MMHG | DIASTOLIC BLOOD PRESSURE: 76 MMHG | WEIGHT: 233 LBS | BODY MASS INDEX: 37.45 KG/M2 | HEART RATE: 92 BPM | RESPIRATION RATE: 18 BRPM

## 2017-02-24 DIAGNOSIS — O30.043 DICHORIONIC DIAMNIOTIC TWIN PREGNANCY IN THIRD TRIMESTER: Primary | ICD-10-CM

## 2017-02-24 DIAGNOSIS — O30.043 DICHORIONIC DIAMNIOTIC TWIN PREGNANCY IN THIRD TRIMESTER: ICD-10-CM

## 2017-02-24 DIAGNOSIS — O36.8390 FETAL TACHYCARDIA AFFECTING MANAGEMENT OF MOTHER: ICD-10-CM

## 2017-02-24 PROCEDURE — 76819 FETAL BIOPHYS PROFIL W/O NST: CPT | Performed by: OBSTETRICS & GYNECOLOGY

## 2017-02-24 PROCEDURE — 76816 OB US FOLLOW-UP PER FETUS: CPT | Mod: 59

## 2017-02-24 RX ORDER — SODIUM CHLORIDE, SODIUM LACTATE, POTASSIUM CHLORIDE, CALCIUM CHLORIDE 600; 310; 30; 20 MG/100ML; MG/100ML; MG/100ML; MG/100ML
INJECTION, SOLUTION INTRAVENOUS CONTINUOUS
Status: CANCELLED | OUTPATIENT
Start: 2017-02-24

## 2017-02-24 RX ORDER — OXYTOCIN/0.9 % SODIUM CHLORIDE 30/500 ML
1-24 PLASTIC BAG, INJECTION (ML) INTRAVENOUS CONTINUOUS
Status: CANCELLED | OUTPATIENT
Start: 2017-02-24

## 2017-02-24 RX ORDER — LIDOCAINE 40 MG/G
CREAM TOPICAL
Status: CANCELLED | OUTPATIENT
Start: 2017-02-24

## 2017-02-24 RX ORDER — TERBUTALINE SULFATE 1 MG/ML
0.25 INJECTION, SOLUTION SUBCUTANEOUS
Status: CANCELLED | OUTPATIENT
Start: 2017-02-24

## 2017-02-24 NOTE — PROVIDER NOTIFICATION
02/23/17 2030   Provider Notification   Provider Name/Title Dr. Jaramillo   Method of Notification Phone   Request Evaluate - Remote   Notification Reason Status Update   Dr. Jaramillo notified that pt's cervix is 3.5/50/-3.  Notified that cervix does not seem favorable at all and has not changed.  Pt is on 12 milliunits of pitocin.  Dr. Jaramillo is going to come in and evaluate

## 2017-02-24 NOTE — MR AVS SNAPSHOT
After Visit Summary   2/24/2017    Evelyn Miner    MRN: [id number]           Patient Information     Date Of Birth          1990        Visit Information        Provider Department      2/24/2017 9:15 AM Tyler Cordova MD [facility] Maternal Fetal Medicine [facility]        Today's Diagnoses     Dichorionic diamniotic twin pregnancy in third trimester    -  1       Follow-ups after your visit        Your next 10 appointments already scheduled     Mar 03, 2017  1:30 PM CST   MFM BPP TWINS with RHMFMUSR2   [facility] Maternal Fetal Medicine Ultrasound - [facility])    [address]  [facility] [address]   [phone]            Mar 03, 2017  2:00 PM CST   Radiology MD with RH MFELDON ELIAS   [facility] Maternal Fetal Medicine [facility])    [address] [facility] [address]  [facility] [id number] [phone]           Please arrive at the time given for your first appointment.  This visit is used internally to schedule the physician's time during your ultrasound.            Mar 07, 2017   Procedure with Diya Quinones MD   [facility] Labor and Delivery (--)    [address]  [facility] [id number] [phone]              Who to contact     If you have questions or need follow up information about today's clinic visit or your schedule please contact [facility] directly at [phone].  Normal or non-critical lab and imaging results will be communicated to you by MyChart, letter or phone within 4 business days after the clinic has received the results. If you do not hear from us within 7 days, please contact the clinic through MyChart or phone. If you have a critical or abnormal lab result, we will notify you by phone as soon as possible.  Submit refill requests through [employer] or call your pharmacy and they will forward the refill request to us. Please allow 3 business days for your refill to  be completed.          Additional Information About Your Visit        [employer] Information     [employer] gives you secure access to your electronic health record. If you see a primary care provider, you can also send messages to your care team and make appointments. If you have questions, please call your primary care clinic.  If you do not have a primary care provider, please call [phone] and they will assist you.        Care EveryWhere ID     This is your Care EveryWhere ID. This could be used by other organizations to access your [facility] medical records  [id number]        Your Vitals Were     Last Period                   06/10/2016            Blood Pressure from Last 3 Encounters:   02/24/17 125/76   02/17/17 120/67   02/05/17 131/73    Weight from Last 3 Encounters:   02/23/17 105.7 kg (233 lb)   02/05/17 104.8 kg (231 lb)   01/06/17 100.3 kg (221 lb 3.2 oz)              Today, you had the following     No orders found for display         Today's Medication Changes      Notice     This visit is during an admission. Changes to the med list made in this visit will be reflected in the After Visit Summary of the admission.             Primary Care Provider Office Phone # Fax #    Nicolette Richard [facility] [phone]       [facility] [address] [place] [address]        Thank you!     Thank you for choosing [facility]  for your care. Our goal is always to provide you with excellent care. Hearing back from our patients is one way we can continue to improve our services. Please take a few minutes to complete the written survey that you may receive in the mail after your visit with us. Thank you!             Your Updated Medication List - Protect others around you: Learn how to safely use, store and throw away your medicines at www.disposemymeds.org.      Notice     This visit is during an admission. Changes to the med list made in this visit will be  reflected in the After Visit Summary of the admission.

## 2017-02-24 NOTE — ANESTHESIA POSTPROCEDURE EVALUATION
Patient: Evelyn Miner    * No procedures listed *    Diagnosis:* No pre-op diagnosis entered *  Diagnosis Additional Information: Labor pain    Anesthesia Type:  No value filed.    Note:  Anesthesia Post Evaluation    Last vitals:  Vitals:    02/24/17 0044 02/24/17 0159 02/24/17 0413   BP: 100/50 123/63 107/65   Pulse:      Resp: 18  18   Temp:   97.8  F (36.6  C)         Electronically Signed By: Can Pimentel MD  February 24, 2017  6:48 AM    S/P epidural for labor.   I or my partner was immediately available for management of this patient during epidural analgesia infusion.  VSS.  Doing well. Block resolved.  Neuro at baseline. Denies positional headache. Minimal side effects easily managed w/ PRN meds. No apparent anesthetic complications. No follow-up required. Pt was discovered to NOT be in labor, epidural was stopped, catheter pulled, pt discharged.  Can Pimentel MD

## 2017-02-24 NOTE — PLAN OF CARE
Bedside report received from Mari Ramirez RN, cares assumed. Patient resting. Plan to do morning NSTs and recheck cervix, if no regular contractions and cervix unchanged, may d/c home undelivered. Patient states she is still monica but much less frequently and rates her pain 1/10. Denies LOF, did have some brown blood on toilet paper when up to bathroom. Monitors applied.

## 2017-02-24 NOTE — PROVIDER NOTIFICATION
02/23/17 4459   Comments   Comments Report given to Mani Guerra who is now assuming pt care.

## 2017-02-24 NOTE — DISCHARGE SUMMARY
"OB Progress Note  2017  9:20 PM    S:  Comfortable with epidural, not feeling ctx, very numb.       O:  BP 93/54  Pulse 92  Temp 97.6  F (36.4  C) (Oral)  Resp 18  Ht 1.676 m (5' 6\")  Wt 105.7 kg (233 lb)  LMP 06/10/2016  BMI 37.61 kg/m2  EFM:   Baby 1: baseline 150, accelerations present, absent decelerations, moderate variability; Category I  Baby 2: baseline 145, moderate variability, accelerations present, absent decels, moderate variability, Category I  [place]:  Ctx q2-4 min  SVE:  3.5/50%/-4 (my exam)  Membranes: Intact    Pitocin at 12 mU/min    A/P:  26 year old  @36w6d admitted with early labor/prodromal labor    1.  Cervix remains unchanged from 11am exam despite Pitocin. Lengthy discussion with Evelyn and her  that at this time we are essentially electively inducing her at a  gestation. I recommend turning off the Pitocin & epidural at this time, monitoring for a few hours to overnight, and discharge home if she is not in active labor at that time. Risks of IOL and  delivery, especially with twins, discussed. They are in agreement with that plan. Return precautions discussed. If discharges home, recommend f/u in the clinic on Monday.     Daniela Jaramillo    "

## 2017-02-24 NOTE — DISCHARGE INSTRUCTIONS
Discharge Instruction for Undelivered Patients      You were seen for: Labor Assessment  We Consulted: Dr Jaramillo  You had (Test or Medicine):monitoring, epidural     Diet:   Drink 8 to 12 glasses of liquids (milk, juice, water) every day.  You may eat meals and snacks.     Activity:  Call your doctor or nurse midwife if your baby is moving less than usual.     Call your provider if you notice:  Swelling in your face or increased swelling in your hands or legs.  Headaches that are not relieved by Tylenol (acetaminophen).  Changes in your vision (blurring: seeing spots or stars.)  Nausea (sick to your stomach) and vomiting (throwing up).   Weight gain of 5 pounds or more per week.  Heartburn that doesn't go away.  Signs of bladder infection: pain when you urinate (use the toilet), need to go more often and more urgently.  The bag of andres (rupture of membranes) breaks, or you notice leaking in your underwear.  Bright red blood in your underwear.  Abdominal (lower belly) or stomach pain.  For first baby: Contractions (tightening) less than 5 minutes apart for one hour or more.  Second (plus) baby: Contractions (tightening) less than 10 minutes apart and getting stronger.  *If less than 34 weeks: Contractions (tightenings) more than 6 times in one hour.  Increase or change in vaginal discharge (note the color and amount)  Other:     Follow-up:  At [facility] at 0845, office at 1000

## 2017-02-24 NOTE — PROVIDER NOTIFICATION
17 1797   Provider Notification   Provider Name/Title Dr. Jaramillo   Method of Notification At Bedside   Request Evaluate in Person   Notification Reason Status Update   Dr. Jaramillo here at bedside and examined pt's cervix.  SVE was 3.5/50/-3 same. Md discussed options that she does not feel that pt is truly in labor and is .  Md would like to stop the pitocin and then eventually stop the epidural if contractions space out.  Leave the epidural catheter in place for now.  May d/c the palma once the epidural begins to wear off. Pt may eat and Saline lock the IV.  Md suggests that we watch the pt overnight and if in the morning there is no cervical change pt may discharge to home.   Pt and  are disappointed but are in aggreance with this plan.

## 2017-02-24 NOTE — PROGRESS NOTES
Pt up to bathroom with SBA. Pt steady on feet. Catheter removed and 1800 cc drained. Pt denies pain but reports occassional discomfort from epidural tubing and occasional Cx. External monitors removed to promote pt rest. Pt instructed to call RN if pt feels 3+ cx, SROM, increased discomfort, or change in overall status.

## 2017-02-24 NOTE — PLAN OF CARE
0750-M called, have an opening at 0845. Plan for patient to go straight to [facility] as long as ok to discharge from hospital.   0800-NSTs reactive, cervix 3/50/-3. Plan to discharge to home. Patient stated she had OB appt at clinic today but had it cancelled after admission yesterday. Patient requesting to see OB after MFM appointment. Clinic called and appointment scheduled for 1000. All discharge instructions reviewed with patient, patient verbalizes understanding of when to call OB. Will go straight to [facility] then OB clinic. Out the door ambulating.

## 2017-02-24 NOTE — PROVIDER NOTIFICATION
02/24/17 0331   Provider Notification   Provider Name/Title Dr. Jaramillo   Method of Notification Phone   Request Evaluate - Remote   Notification Reason Status Update   Updated MD that cx have spaced to 10-12 minutes, pt not feeling them. RN has had difficulty monitoring twin B- audible  no decels heard. Twin A reactive. MD ok with getting a reactive strip on Twin B and then taking off monitors until later this morning when pt should have a NST, SVE, and then if reactive and no cervical change pt can discharge home.

## 2017-02-24 NOTE — PROGRESS NOTES
"Please see \"Imaging\" tab under \"Chart Review\" for details of today's US at the [facility].    Tyler Cordova MD  Maternal-Fetal Medicine    "

## 2017-02-28 NOTE — PHARMACY-ADMISSION MEDICATION HISTORY
Admission medication history interview status for this patient is complete. See [facility] admission navigator for allergy information, prior to admission medications and immunization status.     Medication history interview source(s):Patient  Medication history resources (including written lists, pill bottles, clinic record):-  Primary pharmacy:-    PTA meds completed by pre-admitting nurse Jolene Azevedo and reviewed by pharmacy     Actions taken by pharmacist (provider contacted, etc):None     Additional medication history information:None    Medication reconciliation/reorder completed by provider prior to medication history? N/A    For patients on insulin therapy: No    Prior to Admission medications    Medication Sig Last Dose Taking? Auth Provider   Magnesium Hydroxide (MILK OF MAGNESIA PO) prn  Yes Reported, Patient   sennosides (SENOKOT) 8.6 MG tablet Take 1 tablet by mouth daily as needed   Yes Reported, Patient   Prenatal Vit-Fe Fumarate-FA (PRENATAL MULTIVITAMIN  PLUS IRON) 27-0.8 MG TABS Take 1 tablet by mouth daily  Yes Reported, Patient

## 2017-03-02 ENCOUNTER — HOSPITAL ENCOUNTER (OUTPATIENT)
Dept: LAB | Facility: CLINIC | Age: 27
Discharge: HOME | End: 2017-03-02
Attending: OBSTETRICS & GYNECOLOGY | Admitting: OBSTETRICS & GYNECOLOGY
Payer: COMMERCIAL

## 2017-03-02 DIAGNOSIS — Z01.812 PRE-OPERATIVE LABORATORY EXAMINATION: ICD-10-CM

## 2017-03-02 LAB
ABO + RH BLD: NORMAL
ABO + RH BLD: NORMAL
BLD GP AB SCN SERPL QL: NORMAL
BLOOD BANK CMNT PATIENT-IMP: NORMAL
HGB BLD-MCNC: 11.2 G/DL (ref 11.7–15.7)
SPECIMEN EXP DATE BLD: NORMAL
T PALLIDUM IGG+IGM SER QL: NEGATIVE

## 2017-03-02 PROCEDURE — 86780 TREPONEMA PALLIDUM: CPT | Performed by: OBSTETRICS & GYNECOLOGY

## 2017-03-02 PROCEDURE — 86850 RBC ANTIBODY SCREEN: CPT | Performed by: OBSTETRICS & GYNECOLOGY

## 2017-03-02 PROCEDURE — 86901 BLOOD TYPING SEROLOGIC RH(D): CPT | Performed by: OBSTETRICS & GYNECOLOGY

## 2017-03-02 PROCEDURE — 85018 HEMOGLOBIN: CPT | Performed by: OBSTETRICS & GYNECOLOGY

## 2017-03-02 PROCEDURE — 86900 BLOOD TYPING SEROLOGIC ABO: CPT | Performed by: OBSTETRICS & GYNECOLOGY

## 2017-03-02 PROCEDURE — 36415 COLL VENOUS BLD VENIPUNCTURE: CPT | Performed by: OBSTETRICS & GYNECOLOGY

## 2017-03-03 ENCOUNTER — ANESTHESIA EVENT (OUTPATIENT)
Dept: OBGYN | Facility: CLINIC | Age: 27
End: 2017-03-03
Payer: COMMERCIAL

## 2017-03-03 ENCOUNTER — ANESTHESIA (OUTPATIENT)
Dept: OBGYN | Facility: CLINIC | Age: 27
End: 2017-03-03
Payer: COMMERCIAL

## 2017-03-03 ENCOUNTER — HOSPITAL ENCOUNTER (INPATIENT)
Facility: CLINIC | Age: 27
LOS: 4 days | Discharge: HOME | End: 2017-03-07
Attending: OBSTETRICS & GYNECOLOGY | Admitting: OBSTETRICS & GYNECOLOGY
Payer: COMMERCIAL

## 2017-03-03 DIAGNOSIS — Z98.891 S/P CESAREAN SECTION: Primary | ICD-10-CM

## 2017-03-03 DIAGNOSIS — D62 ANEMIA DUE TO BLOOD LOSS, ACUTE: ICD-10-CM

## 2017-03-03 LAB — GLUCOSE BLDC GLUCOMTR-MCNC: 74 MG/DL (ref 70–99)

## 2017-03-03 PROCEDURE — 25000125 ZZHC RX 250: Performed by: OBSTETRICS & GYNECOLOGY

## 2017-03-03 PROCEDURE — 71000012 ZZH RECOVERY PHASE 1 LEVEL 1 FIRST HR: Performed by: OBSTETRICS & GYNECOLOGY

## 2017-03-03 PROCEDURE — 36000056 ZZH SURGERY LEVEL 3 1ST 30 MIN: Performed by: OBSTETRICS & GYNECOLOGY

## 2017-03-03 PROCEDURE — 37000009 ZZH ANESTHESIA TECHNICAL FEE, EACH ADDTL 15 MIN: Performed by: OBSTETRICS & GYNECOLOGY

## 2017-03-03 PROCEDURE — 25800025 ZZH RX 258: Performed by: OBSTETRICS & GYNECOLOGY

## 2017-03-03 PROCEDURE — 27210995 ZZH RX 272: Performed by: OBSTETRICS & GYNECOLOGY

## 2017-03-03 PROCEDURE — 00000146 ZZHCL STATISTIC GLUCOSE BY METER IP

## 2017-03-03 PROCEDURE — 25000132 ZZH RX MED GY IP 250 OP 250 PS 637: Performed by: OBSTETRICS & GYNECOLOGY

## 2017-03-03 PROCEDURE — 25000132 ZZH RX MED GY IP 250 OP 250 PS 637: Performed by: ANESTHESIOLOGY

## 2017-03-03 PROCEDURE — 27210794 ZZH OR GENERAL SUPPLY STERILE: Performed by: OBSTETRICS & GYNECOLOGY

## 2017-03-03 PROCEDURE — 25000128 H RX IP 250 OP 636: Performed by: OBSTETRICS & GYNECOLOGY

## 2017-03-03 PROCEDURE — 25000125 ZZHC RX 250: Performed by: NURSE ANESTHETIST, CERTIFIED REGISTERED

## 2017-03-03 PROCEDURE — 71000013 ZZH RECOVERY PHASE 1 LEVEL 1 EA ADDTL HR: Performed by: OBSTETRICS & GYNECOLOGY

## 2017-03-03 PROCEDURE — 12000029 ZZH R&B OB INTERMEDIATE

## 2017-03-03 PROCEDURE — 25000128 H RX IP 250 OP 636: Performed by: NURSE ANESTHETIST, CERTIFIED REGISTERED

## 2017-03-03 PROCEDURE — 36000058 ZZH SURGERY LEVEL 3 EA 15 ADDTL MIN: Performed by: OBSTETRICS & GYNECOLOGY

## 2017-03-03 PROCEDURE — 99213 OFFICE O/P EST LOW 20 MIN: CPT

## 2017-03-03 PROCEDURE — 37000008 ZZH ANESTHESIA TECHNICAL FEE, 1ST 30 MIN: Performed by: OBSTETRICS & GYNECOLOGY

## 2017-03-03 RX ORDER — BISACODYL 10 MG
10 SUPPOSITORY, RECTAL RECTAL DAILY PRN
Status: DISCONTINUED | OUTPATIENT
Start: 2017-03-05 | End: 2017-03-07 | Stop reason: HOSPADM

## 2017-03-03 RX ORDER — CARBOPROST TROMETHAMINE 250 UG/ML
INJECTION, SOLUTION INTRAMUSCULAR
Status: DISCONTINUED
Start: 2017-03-03 | End: 2017-03-03 | Stop reason: WASHOUT

## 2017-03-03 RX ORDER — NALOXONE HYDROCHLORIDE 0.4 MG/ML
.1-.4 INJECTION, SOLUTION INTRAMUSCULAR; INTRAVENOUS; SUBCUTANEOUS
Status: DISCONTINUED | OUTPATIENT
Start: 2017-03-03 | End: 2017-03-07 | Stop reason: HOSPADM

## 2017-03-03 RX ORDER — CEFAZOLIN SODIUM 2 G/100ML
2 INJECTION, SOLUTION INTRAVENOUS
Status: COMPLETED | OUTPATIENT
Start: 2017-03-03 | End: 2017-03-03

## 2017-03-03 RX ORDER — IBUPROFEN 400 MG/1
400-800 TABLET, FILM COATED ORAL EVERY 6 HOURS PRN
Status: DISCONTINUED | OUTPATIENT
Start: 2017-03-04 | End: 2017-03-07 | Stop reason: HOSPADM

## 2017-03-03 RX ORDER — EPHEDRINE SULFATE 50 MG/ML
INJECTION, SOLUTION INTRAVENOUS PRN
Status: DISCONTINUED | OUTPATIENT
Start: 2017-03-03 | End: 2017-03-03

## 2017-03-03 RX ORDER — DIPHENHYDRAMINE HYDROCHLORIDE 50 MG/ML
25 INJECTION INTRAMUSCULAR; INTRAVENOUS EVERY 6 HOURS PRN
Status: DISCONTINUED | OUTPATIENT
Start: 2017-03-03 | End: 2017-03-07 | Stop reason: HOSPADM

## 2017-03-03 RX ORDER — ONDANSETRON 2 MG/ML
INJECTION INTRAMUSCULAR; INTRAVENOUS PRN
Status: DISCONTINUED | OUTPATIENT
Start: 2017-03-03 | End: 2017-03-03

## 2017-03-03 RX ORDER — ACETAMINOPHEN 500 MG
1000 TABLET ORAL ONCE
Status: COMPLETED | OUTPATIENT
Start: 2017-03-03 | End: 2017-03-03

## 2017-03-03 RX ORDER — ACETAMINOPHEN 325 MG/1
650 TABLET ORAL EVERY 4 HOURS PRN
Status: DISCONTINUED | OUTPATIENT
Start: 2017-03-06 | End: 2017-03-07 | Stop reason: HOSPADM

## 2017-03-03 RX ORDER — DEXTROSE, SODIUM CHLORIDE, SODIUM LACTATE, POTASSIUM CHLORIDE, AND CALCIUM CHLORIDE 5; .6; .31; .03; .02 G/100ML; G/100ML; G/100ML; G/100ML; G/100ML
INJECTION, SOLUTION INTRAVENOUS CONTINUOUS
Status: DISCONTINUED | OUTPATIENT
Start: 2017-03-03 | End: 2017-03-07 | Stop reason: HOSPADM

## 2017-03-03 RX ORDER — SODIUM CHLORIDE, SODIUM LACTATE, POTASSIUM CHLORIDE, CALCIUM CHLORIDE 600; 310; 30; 20 MG/100ML; MG/100ML; MG/100ML; MG/100ML
INJECTION, SOLUTION INTRAVENOUS CONTINUOUS
Status: DISCONTINUED | OUTPATIENT
Start: 2017-03-03 | End: 2017-03-03

## 2017-03-03 RX ORDER — FENTANYL CITRATE 50 UG/ML
25-50 INJECTION, SOLUTION INTRAMUSCULAR; INTRAVENOUS
Status: DISCONTINUED | OUTPATIENT
Start: 2017-03-03 | End: 2017-03-03 | Stop reason: HOSPADM

## 2017-03-03 RX ORDER — MAGNESIUM HYDROXIDE 1200 MG/15ML
LIQUID ORAL PRN
Status: DISCONTINUED | OUTPATIENT
Start: 2017-03-03 | End: 2017-03-03

## 2017-03-03 RX ORDER — KETOROLAC TROMETHAMINE 30 MG/ML
30 INJECTION, SOLUTION INTRAMUSCULAR; INTRAVENOUS EVERY 6 HOURS
Status: COMPLETED | OUTPATIENT
Start: 2017-03-03 | End: 2017-03-04

## 2017-03-03 RX ORDER — ONDANSETRON 2 MG/ML
4 INJECTION INTRAMUSCULAR; INTRAVENOUS EVERY 6 HOURS PRN
Status: DISCONTINUED | OUTPATIENT
Start: 2017-03-03 | End: 2017-03-07 | Stop reason: HOSPADM

## 2017-03-03 RX ORDER — OXYTOCIN 10 [USP'U]/ML
10 INJECTION, SOLUTION INTRAMUSCULAR; INTRAVENOUS
Status: DISCONTINUED | OUTPATIENT
Start: 2017-03-03 | End: 2017-03-07 | Stop reason: HOSPADM

## 2017-03-03 RX ORDER — AMOXICILLIN 250 MG
1-2 CAPSULE ORAL 2 TIMES DAILY
Status: DISCONTINUED | OUTPATIENT
Start: 2017-03-03 | End: 2017-03-07 | Stop reason: HOSPADM

## 2017-03-03 RX ORDER — MISOPROSTOL 200 UG/1
400 TABLET ORAL
Status: DISCONTINUED | OUTPATIENT
Start: 2017-03-03 | End: 2017-03-07 | Stop reason: HOSPADM

## 2017-03-03 RX ORDER — OXYCODONE HYDROCHLORIDE 5 MG/1
5-10 TABLET ORAL
Status: DISCONTINUED | OUTPATIENT
Start: 2017-03-03 | End: 2017-03-07 | Stop reason: HOSPADM

## 2017-03-03 RX ORDER — OXYTOCIN/0.9 % SODIUM CHLORIDE 30/500 ML
340 PLASTIC BAG, INJECTION (ML) INTRAVENOUS CONTINUOUS PRN
Status: DISCONTINUED | OUTPATIENT
Start: 2017-03-03 | End: 2017-03-07 | Stop reason: HOSPADM

## 2017-03-03 RX ORDER — ONDANSETRON 2 MG/ML
4 INJECTION INTRAMUSCULAR; INTRAVENOUS EVERY 30 MIN PRN
Status: DISCONTINUED | OUTPATIENT
Start: 2017-03-03 | End: 2017-03-03 | Stop reason: HOSPADM

## 2017-03-03 RX ORDER — ONDANSETRON 2 MG/ML
4 INJECTION INTRAMUSCULAR; INTRAVENOUS EVERY 4 HOURS PRN
Status: DISCONTINUED | OUTPATIENT
Start: 2017-03-03 | End: 2017-03-03

## 2017-03-03 RX ORDER — ONDANSETRON 4 MG/1
4 TABLET, ORALLY DISINTEGRATING ORAL EVERY 30 MIN PRN
Status: DISCONTINUED | OUTPATIENT
Start: 2017-03-03 | End: 2017-03-03 | Stop reason: HOSPADM

## 2017-03-03 RX ORDER — OXYTOCIN/0.9 % SODIUM CHLORIDE 30/500 ML
100 PLASTIC BAG, INJECTION (ML) INTRAVENOUS CONTINUOUS
Status: DISCONTINUED | OUTPATIENT
Start: 2017-03-03 | End: 2017-03-07 | Stop reason: HOSPADM

## 2017-03-03 RX ORDER — ACETAMINOPHEN 325 MG/1
975 TABLET ORAL EVERY 8 HOURS
Status: COMPLETED | OUTPATIENT
Start: 2017-03-03 | End: 2017-03-06

## 2017-03-03 RX ORDER — NALOXONE HYDROCHLORIDE 0.4 MG/ML
.1-.4 INJECTION, SOLUTION INTRAMUSCULAR; INTRAVENOUS; SUBCUTANEOUS
Status: DISCONTINUED | OUTPATIENT
Start: 2017-03-03 | End: 2017-03-03

## 2017-03-03 RX ORDER — HYDROCORTISONE 2.5 %
CREAM (GRAM) TOPICAL 3 TIMES DAILY PRN
Status: DISCONTINUED | OUTPATIENT
Start: 2017-03-03 | End: 2017-03-07 | Stop reason: HOSPADM

## 2017-03-03 RX ORDER — HYDROMORPHONE HYDROCHLORIDE 1 MG/ML
.3-.5 INJECTION, SOLUTION INTRAMUSCULAR; INTRAVENOUS; SUBCUTANEOUS EVERY 30 MIN PRN
Status: DISCONTINUED | OUTPATIENT
Start: 2017-03-03 | End: 2017-03-07 | Stop reason: HOSPADM

## 2017-03-03 RX ORDER — SODIUM CHLORIDE, SODIUM LACTATE, POTASSIUM CHLORIDE, CALCIUM CHLORIDE 600; 310; 30; 20 MG/100ML; MG/100ML; MG/100ML; MG/100ML
INJECTION, SOLUTION INTRAVENOUS CONTINUOUS
Status: DISCONTINUED | OUTPATIENT
Start: 2017-03-03 | End: 2017-03-03 | Stop reason: HOSPADM

## 2017-03-03 RX ORDER — CEFAZOLIN SODIUM 1 G/3ML
1 INJECTION, POWDER, FOR SOLUTION INTRAMUSCULAR; INTRAVENOUS
Status: DISCONTINUED | OUTPATIENT
Start: 2017-03-03 | End: 2017-03-03

## 2017-03-03 RX ORDER — LIDOCAINE 40 MG/G
CREAM TOPICAL
Status: DISCONTINUED | OUTPATIENT
Start: 2017-03-03 | End: 2017-03-07 | Stop reason: HOSPADM

## 2017-03-03 RX ORDER — NALBUPHINE HYDROCHLORIDE 10 MG/ML
2.5-5 INJECTION, SOLUTION INTRAMUSCULAR; INTRAVENOUS; SUBCUTANEOUS EVERY 6 HOURS PRN
Status: DISCONTINUED | OUTPATIENT
Start: 2017-03-03 | End: 2017-03-03

## 2017-03-03 RX ORDER — SIMETHICONE 80 MG
80 TABLET,CHEWABLE ORAL 4 TIMES DAILY PRN
Status: DISCONTINUED | OUTPATIENT
Start: 2017-03-03 | End: 2017-03-07 | Stop reason: HOSPADM

## 2017-03-03 RX ORDER — METHYLERGONOVINE MALEATE 0.2 MG/ML
INJECTION INTRAVENOUS
Status: DISCONTINUED
Start: 2017-03-03 | End: 2017-03-03 | Stop reason: WASHOUT

## 2017-03-03 RX ORDER — LANOLIN 100 %
OINTMENT (GRAM) TOPICAL
Status: DISCONTINUED | OUTPATIENT
Start: 2017-03-03 | End: 2017-03-07 | Stop reason: HOSPADM

## 2017-03-03 RX ORDER — DIPHENHYDRAMINE HCL 25 MG
25 CAPSULE ORAL EVERY 6 HOURS PRN
Status: DISCONTINUED | OUTPATIENT
Start: 2017-03-03 | End: 2017-03-07 | Stop reason: HOSPADM

## 2017-03-03 RX ORDER — BUPIVACAINE HYDROCHLORIDE 7.5 MG/ML
INJECTION, SOLUTION INTRASPINAL PRN
Status: DISCONTINUED | OUTPATIENT
Start: 2017-03-03 | End: 2017-03-03

## 2017-03-03 RX ORDER — MORPHINE SULFATE 1 MG/ML
INJECTION, SOLUTION EPIDURAL; INTRATHECAL; INTRAVENOUS PRN
Status: DISCONTINUED | OUTPATIENT
Start: 2017-03-03 | End: 2017-03-03

## 2017-03-03 RX ADMIN — PHENYLEPHRINE HYDROCHLORIDE 100 MCG: 10 INJECTION, SOLUTION INTRAMUSCULAR; INTRAVENOUS; SUBCUTANEOUS at 10:26

## 2017-03-03 RX ADMIN — EPHEDRINE SULFATE 7.5 MG: 50 INJECTION INTRAMUSCULAR; INTRAVENOUS; SUBCUTANEOUS at 10:53

## 2017-03-03 RX ADMIN — PHENYLEPHRINE HYDROCHLORIDE 100 MCG: 10 INJECTION, SOLUTION INTRAMUSCULAR; INTRAVENOUS; SUBCUTANEOUS at 10:38

## 2017-03-03 RX ADMIN — PHENYLEPHRINE HYDROCHLORIDE 100 MCG: 10 INJECTION, SOLUTION INTRAMUSCULAR; INTRAVENOUS; SUBCUTANEOUS at 10:42

## 2017-03-03 RX ADMIN — PHENYLEPHRINE HYDROCHLORIDE 100 MCG: 10 INJECTION, SOLUTION INTRAMUSCULAR; INTRAVENOUS; SUBCUTANEOUS at 10:17

## 2017-03-03 RX ADMIN — KETOROLAC TROMETHAMINE 30 MG: 30 INJECTION, SOLUTION INTRAMUSCULAR at 12:39

## 2017-03-03 RX ADMIN — SENNOSIDES AND DOCUSATE SODIUM 1 TABLET: 8.6; 5 TABLET ORAL at 20:33

## 2017-03-03 RX ADMIN — SODIUM CHLORIDE, SODIUM LACTATE, POTASSIUM CHLORIDE, CALCIUM CHLORIDE AND DEXTROSE MONOHYDRATE: 5; 600; 310; 30; 20 INJECTION, SOLUTION INTRAVENOUS at 17:01

## 2017-03-03 RX ADMIN — PHENYLEPHRINE HYDROCHLORIDE 100 MCG: 10 INJECTION, SOLUTION INTRAMUSCULAR; INTRAVENOUS; SUBCUTANEOUS at 10:51

## 2017-03-03 RX ADMIN — KETOROLAC TROMETHAMINE 30 MG: 30 INJECTION, SOLUTION INTRAMUSCULAR at 18:31

## 2017-03-03 RX ADMIN — OXYTOCIN-SODIUM CHLORIDE 0.9% IV SOLN 30 UNIT/500ML 340 ML/HR: 30-0.9/5 SOLUTION at 12:03

## 2017-03-03 RX ADMIN — EPHEDRINE SULFATE 7.5 MG: 50 INJECTION INTRAMUSCULAR; INTRAVENOUS; SUBCUTANEOUS at 10:18

## 2017-03-03 RX ADMIN — SODIUM CHLORIDE, POTASSIUM CHLORIDE, SODIUM LACTATE AND CALCIUM CHLORIDE 1000 ML: 600; 310; 30; 20 INJECTION, SOLUTION INTRAVENOUS at 08:53

## 2017-03-03 RX ADMIN — PHENYLEPHRINE HYDROCHLORIDE 50 MCG: 10 INJECTION, SOLUTION INTRAMUSCULAR; INTRAVENOUS; SUBCUTANEOUS at 10:08

## 2017-03-03 RX ADMIN — PHENYLEPHRINE HYDROCHLORIDE 100 MCG: 10 INJECTION, SOLUTION INTRAMUSCULAR; INTRAVENOUS; SUBCUTANEOUS at 10:46

## 2017-03-03 RX ADMIN — PHENYLEPHRINE HYDROCHLORIDE 100 MCG: 10 INJECTION, SOLUTION INTRAMUSCULAR; INTRAVENOUS; SUBCUTANEOUS at 10:50

## 2017-03-03 RX ADMIN — MORPHINE SULFATE 0.3 MG: 1 INJECTION EPIDURAL; INTRATHECAL; INTRAVENOUS at 10:04

## 2017-03-03 RX ADMIN — ONDANSETRON 4 MG: 2 INJECTION INTRAMUSCULAR; INTRAVENOUS at 10:38

## 2017-03-03 RX ADMIN — SODIUM CITRATE AND CITRIC ACID MONOHYDRATE 30 ML: 500; 334 SOLUTION ORAL at 09:53

## 2017-03-03 RX ADMIN — SODIUM CHLORIDE, POTASSIUM CHLORIDE, SODIUM LACTATE AND CALCIUM CHLORIDE: 600; 310; 30; 20 INJECTION, SOLUTION INTRAVENOUS at 09:31

## 2017-03-03 RX ADMIN — PHENYLEPHRINE HYDROCHLORIDE 100 MCG: 10 INJECTION, SOLUTION INTRAMUSCULAR; INTRAVENOUS; SUBCUTANEOUS at 11:05

## 2017-03-03 RX ADMIN — DIPHENHYDRAMINE HYDROCHLORIDE 25 MG: 50 INJECTION, SOLUTION INTRAMUSCULAR; INTRAVENOUS at 13:28

## 2017-03-03 RX ADMIN — PHENYLEPHRINE HYDROCHLORIDE 100 MCG: 10 INJECTION, SOLUTION INTRAMUSCULAR; INTRAVENOUS; SUBCUTANEOUS at 10:07

## 2017-03-03 RX ADMIN — PHENYLEPHRINE HYDROCHLORIDE 50 MCG: 10 INJECTION, SOLUTION INTRAMUSCULAR; INTRAVENOUS; SUBCUTANEOUS at 10:48

## 2017-03-03 RX ADMIN — CEFAZOLIN SODIUM 2 G: 2 INJECTION, SOLUTION INTRAVENOUS at 09:58

## 2017-03-03 RX ADMIN — EPHEDRINE SULFATE 5 MG: 50 INJECTION INTRAMUSCULAR; INTRAVENOUS; SUBCUTANEOUS at 10:50

## 2017-03-03 RX ADMIN — PHENYLEPHRINE HYDROCHLORIDE 100 MCG: 10 INJECTION, SOLUTION INTRAMUSCULAR; INTRAVENOUS; SUBCUTANEOUS at 10:10

## 2017-03-03 RX ADMIN — PHENYLEPHRINE HYDROCHLORIDE 100 MCG: 10 INJECTION, SOLUTION INTRAMUSCULAR; INTRAVENOUS; SUBCUTANEOUS at 10:34

## 2017-03-03 RX ADMIN — BUPIVACAINE HYDROCHLORIDE IN DEXTROSE 10.5 MG: 7.5 INJECTION, SOLUTION SUBARACHNOID at 10:04

## 2017-03-03 RX ADMIN — EPHEDRINE SULFATE 5 MG: 50 INJECTION INTRAMUSCULAR; INTRAVENOUS; SUBCUTANEOUS at 10:25

## 2017-03-03 RX ADMIN — ACETAMINOPHEN 975 MG: 325 TABLET, FILM COATED ORAL at 20:33

## 2017-03-03 RX ADMIN — PHENYLEPHRINE HYDROCHLORIDE 100 MCG: 10 INJECTION, SOLUTION INTRAMUSCULAR; INTRAVENOUS; SUBCUTANEOUS at 10:22

## 2017-03-03 RX ADMIN — OXYTOCIN-SODIUM CHLORIDE 0.9% IV SOLN 30 UNIT/500ML: 30-0.9/5 SOLUTION at 10:33

## 2017-03-03 RX ADMIN — OXYCODONE HYDROCHLORIDE 5 MG: 5 TABLET ORAL at 20:33

## 2017-03-03 RX ADMIN — SODIUM CHLORIDE, POTASSIUM CHLORIDE, SODIUM LACTATE AND CALCIUM CHLORIDE: 600; 310; 30; 20 INJECTION, SOLUTION INTRAVENOUS at 09:58

## 2017-03-03 RX ADMIN — PHENYLEPHRINE HYDROCHLORIDE 100 MCG: 10 INJECTION, SOLUTION INTRAMUSCULAR; INTRAVENOUS; SUBCUTANEOUS at 11:16

## 2017-03-03 RX ADMIN — ACETAMINOPHEN 1000 MG: 500 TABLET, COATED ORAL at 09:21

## 2017-03-03 NOTE — ANESTHESIA PROCEDURE NOTES
Peripheral nerve/Neuraxial procedure note : intrathecal  Pre-Procedure  Performed by TRAN NOLASCO  Location: OR, OB      Pre-Anesthestic Checklist: patient identified, IV checked, risks and benefits discussed, informed consent, monitors and equipment checked and pre-op evaluation    Timeout  Correct Patient: Yes   Correct Procedure: Yes   Correct Site: Yes   Correct Laterality: N/A   Correct Position: Yes   Site Marked: No   .   Procedure Documentation  ASA 2  .    Procedure:    Intrathecal.  Insertion Site:L2-3  (midline approach)      Patient Prep;mask, sterile gloves, povidone-iodine 7.5% surgical scrub.  .  Needle: (). # of attempts: 1. # of redirects:. Spinal Needle: Sprotte 24 G. 3.5 in.  No introducer used. . .     Assessment/Narrative  Paresthesias: No.  .  .  . Time Injected: 10:04  Comments:  [id number], [id number] , exp. 2018-03-01    10.5 mg bupivicaine and 0.3 mg morphine placed.

## 2017-03-03 NOTE — IP AVS SNAPSHOT
[facility]    [address]    [facility] [address]    Phone:  [phone]    Fax:  [phone]                                       After Visit Summary   3/3/2017    Evelyn Miner    MRN: [id number]           After Visit Summary Signature Page     I have received my discharge instructions, and my questions have been answered. I have discussed any challenges I see with this plan with the nurse or doctor.    ..........................................................................................................................................  Patient/Patient Representative Signature      ..........................................................................................................................................  Patient Representative Print Name and Relationship to Patient    ..................................................               ................................................  Date                                            Time    ..........................................................................................................................................  Reviewed by Signature/Title    ...................................................              ..............................................  Date                                                            Time

## 2017-03-03 NOTE — PLAN OF CARE
Data: Evelyn Miner transferred to 454 via cart at 1420. Baby transferred via parent's arms.  Action: Receiving unit notified of transfer: Yes. Patient and family notified of room change. Report given to MIA Ochoa at 1505. Belongings sent to receiving unit. Accompanied by Registered Nurse. Oriented patient to surroundings. Call light within reach. ID bands double-checked with receiving RN.  Response: Patient tolerated transfer and is stable.

## 2017-03-03 NOTE — ANESTHESIA PREPROCEDURE EVALUATION
PAC NOTE:       ANESTHESIA PRE EVALUATION:  Anesthesia Evaluation     . Pt has had prior anesthetic. Type: General    No history of anesthetic complications     ROS/MED HX    ENT/Pulmonary:  - neg pulmonary ROS     Neurologic:  - neg neurologic ROS     Cardiovascular:  - neg cardiovascular ROS       METS/Exercise Tolerance:     Hematologic:  - neg hematologic  ROS       Musculoskeletal:  - neg musculoskeletal ROS       GI/Hepatic:     (+) GERD       Renal/Genitourinary:  - ROS Renal section negative       Endo:  - neg endo ROS       Psychiatric:  - neg psychiatric ROS       Infectious Disease:  - neg infectious disease ROS       Malignancy:      - no malignancy   Other:    (+) Possibly pregnant C-spine cleared: N/A, no other significant disability              Physical Exam  Normal systems: cardiovascular, pulmonary and dental    Airway   Mallampati: I  TM distance: >3 FB  Neck ROM: full    Dental     Cardiovascular       Pulmonary              Anesthesia Plan      History & Physical Review  History and physical reviewed and following examination; no interval change.    ASA Status:  2 .        Plan for Spinal   PONV prophylaxis:  Ondansetron (or other 5HT-3)       Postoperative Care  Postoperative pain management:  IV analgesics, Oral pain medications and Neuraxial analgesia.      Consents  Anesthetic plan, risks, benefits and alternatives discussed with:  Patient..                            .

## 2017-03-03 NOTE — IP AVS SNAPSHOT
MRN:[id number]                      After Visit Summary   3/3/2017    Evelyn Miner    MRN: [id number]           Thank you!     Thank you for choosing [facility] for your care. Our goal is always to provide you with excellent care. Hearing back from our patients is one way we can continue to improve our services. Please take a few minutes to complete the written survey that you may receive in the mail after you visit. If you would like to speak to someone directly about your visit please contact Patient Relations at [phone]. Thank you!          Patient Information     Date Of Birth          1990        About your hospital stay     You were admitted on:  March 3, 2017 You last received care in the:  [facility]    You were discharged on:  2017       Who to Call     For medical emergencies, please call 911.  For non-urgent questions about your medical care, please call your primary care provider or clinic, [phone]  For questions related to your surgery, please call your surgery clinic        Attending Provider     Provider Specialty    Humza Bustamante MD OB/Gyn    Mima Mendes MD OB/Gyn       Primary Care Provider Office Phone # Fax #    Nicolette Richard [facility] [phone]       [facility] [address] [place] [address]        After Care Instructions     Activity       Review discharge instructions            Diet       Resume previous diet            Discharge Instructions - Postpartum visit       Schedule postpartum visit with your provider and return to clinic in 6 weeks.  Use over the counter ibuprofen 800 mg every 8 hours as needed.                  Further instructions from your care team       Postop  Birth Instructions  Lactation [phone]    Activity       Do not lift more than 10 pounds for 6 weeks after surgery.  Ask family and friends for help when you need it.    No driving until  you have stopped taking your pain medications (usually two weeks after surgery).    No heavy exercise or activity for 6 weeks.  Don't do anything that will put a strain on your surgery site.    Don't strain when using the toilet.  Your care team may prescribe a stool softener if you have problems with your bowel movements.     To care for your incision:       Keep the incision clean and dry.    Do not soak your incision in water. No swimming or hot tubs until it has fully healed. You may soak in the bathtub if the water level is below your incision.    Do not use peroxide, gel, cream, lotion, or ointment on your incision.    Adjust your clothes to avoid pressure on your surgery site (check the elastic in your underwear for example).     You may see a small amount of clear or pink drainage and this is normal.  Check with your health care provider:       If the drainage increases or has an odor.    If the incision reddens, you have swelling, or develop a rash.    If you have increased pain and the medicine we prescribed doesn't help.    If you have a fever above 100.4 F (38 C) with or without chills when placing thermometer under your tongue.   The area around your incision (surgery wound), will feel numb.  This is normal. The numbness should go away in less than a year.     Keep your hands clean:  Always wash your hands before touching your incision (surgery wound). This helps reduce your risk of infection. If your hands aren't dirty, you may use an alcohol hand-rub to clean your hands. Keep your nails clean and short.    Call your healthcare provider if you have any of these symptoms:       You soak a sanitary pad with blood within 1 hour, or you see blood clots larger than a golf ball.    Bleeding that lasts more than 6 weeks.    Vaginal discharge that smells bad.    Severe pain, cramping or tenderness in your lower belly area.    A need to urinate more frequently (use the toilet more often), more urgently (use the  "toilet very quickly), or it burns when you urinate.    Nausea and vomiting.    Redness, swelling or pain around a vein in your leg.    Problems breastfeeding or a red or painful area on your breast.    Chest pain and cough or are gasping for air.    Problems with coping with sadness, anxiety or depression. If you have concerns about hurting yourself or the baby, call your provider immediately.      You have questions or concerns after you return home.                  Pending Results     No orders found from 3/1/2017 to 3/4/2017.            Statement of Approval     Ordered          03/07/17 0834  I have reviewed and agree with all the recommendations and orders detailed in this document.  EFFECTIVE NOW     Approved and electronically signed by:  Diya Quinones MD             Admission Information     Date & Time Provider Department Dept. Phone    3/3/2017 Mima Mendes MD [facility] Postpartum [phone]      Your Vitals Were     Blood Pressure Pulse Temperature Respirations Height Weight    105/69 95 98.4  F (36.9  C) (Oral) 18 1.676 m (5' 6\") 106.1 kg (233 lb 12.8 oz)    Last Period Pulse Oximetry BMI (Body Mass Index)             06/10/2016 100% 37.74 kg/m2         [facility] Information     [employer] gives you secure access to your electronic health record. If you see a primary care provider, you can also send messages to your care team and make appointments. If you have questions, please call your primary care clinic.  If you do not have a primary care provider, please call [phone] and they will assist you.        Care EveryWhere ID     This is your Care EveryWhere ID. This could be used by other organizations to access your [facility] medical records  [id number]           Review of your medicines      START taking        Dose / Directions    ferrous sulfate 142 (45 FE) MG Tbcr   Commonly known as:  SLO-FE   Used for:  Anemia due to blood loss, acute        Dose:  142 mg   Take 1 tablet (142 mg) " by mouth daily   Quantity:  60 tablet   Refills:  0       oxyCODONE 5 MG IR tablet   Commonly known as:  ROXICODONE        Dose:  5-10 mg   Take 1-2 tablets (5-10 mg) by mouth every 3 hours as needed for moderate to severe pain   Quantity:  20 tablet   Refills:  0       senna-docusate 8.6-50 MG per tablet   Commonly known as:  SENOKOT-S;PERICOLACE        Dose:  1-2 tablet   Take 1-2 tablets by mouth 2 times daily as needed for constipation   Quantity:  40 tablet   Refills:  0         CONTINUE these medicines which have NOT CHANGED        Dose / Directions    prenatal multivitamin  plus iron 27-0.8 MG Tabs per tablet        Dose:  1 tablet   Take 1 tablet by mouth daily   Refills:  0         STOP taking     MILK OF MAGNESIA PO           sennosides 8.6 MG tablet   Commonly known as:  SENOKOT                Where to get your medicines      Some of these will need a paper prescription and others can be bought over the counter. Ask your nurse if you have questions.     Bring a paper prescription for each of these medications     ferrous sulfate 142 (45 FE) MG Tbcr    oxyCODONE 5 MG IR tablet    senna-docusate 8.6-50 MG per tablet                Protect others around you: Learn how to safely use, store and throw away your medicines at www.disposemymeds.org.             Medication List: This is a list of all your medications and when to take them. Check marks below indicate your daily home schedule. Keep this list as a reference.      Medications           Morning Afternoon Evening Bedtime As Needed    ferrous sulfate 142 (45 FE) MG Tbcr   Commonly known as:  SLO-FE   Take 1 tablet (142 mg) by mouth daily                                oxyCODONE 5 MG IR tablet   Commonly known as:  ROXICODONE   Take 1-2 tablets (5-10 mg) by mouth every 3 hours as needed for moderate to severe pain   Last time this was given:  5 mg on 3/7/2017  7:30 AM                                prenatal multivitamin  plus iron 27-0.8 MG Tabs per tablet    Take 1 tablet by mouth daily                                senna-docusate 8.6-50 MG per tablet   Commonly known as:  SENOKOT-S;PERICOLACE   Take 1-2 tablets by mouth 2 times daily as needed for constipation   Last time this was given:  2 tablets on 3/7/2017  7:30 AM

## 2017-03-03 NOTE — ANESTHESIA CARE TRANSFER NOTE
Patient: Evelyn Miner    Procedure(s):  Primary  Section - TWINS  - Wound Class: I-Clean    Diagnosis: TWINS - will be 38 weeks  Diagnosis Additional Information: No value filed.    Anesthesia Type:   Spinal     Note:  Airway :Room Air  Patient transferred to:Labor and Delivery  Comments: VSS      Vitals: (Last set prior to Anesthesia Care Transfer)    CRNA VITALS  3/3/2017 1053 - 3/3/2017 1128      3/3/2017             NIBP: 103/56    NIBP Mean: 72                Electronically Signed By: PIPO Talbert CRNA  March 3, 2017  11:28 AM

## 2017-03-03 NOTE — ANESTHESIA POSTPROCEDURE EVALUATION
Patient: Evelyn Miner    Procedure(s):  Primary  Section - TWINS  - Wound Class: I-Clean    Diagnosis:TWINS - will be 38 weeks  Diagnosis Additional Information: 1. Intrauterine pregnancy at 38w0d  2. Di/di twin gestation unstable lie of twin B    Anesthesia Type:  Spinal    Note:  Anesthesia Post Evaluation    Patient location during evaluation: Bedside  Patient participation: Able to fully participate in evaluation  Level of consciousness: awake and alert  Pain management: adequate  Airway patency: patent  Cardiovascular status: acceptable  Respiratory status: acceptable  Hydration status: acceptable  PONV: none     Anesthetic complications: None          Last vitals:  Vitals:    17 1132 17 1137 17 1142   BP: 100/55 102/56 90/54   Resp:      Temp:      SpO2:            Electronically Signed By: Julian Bassett MD  March 3, 2017  11:59 AM

## 2017-03-03 NOTE — OP NOTE
[facility]  Obstetrics Service Operative Report    Surgery Date:  March 3, 2017  Surgeon(s): Humza Bustamante      Preoperative Diagnoses:  1.  Intrauterine pregnancy at 38w0d  2.  Di/di twin gestation unstable lie of twin B    Postoperative diagnoses: Same     Procedure performed:  primary low segment transverse  section via pfann incision with 2 layer uterine closure    Anesthesia:  Spinal with duramorph  Est Blood Loss (mL): 1500 mL  Fluid replacement: 2000 mL lactated Ringer's.   Specimens: cord blood   Complications: None      Operative findings: Baby A Viable female infant at 1030 hours on 2017. Apgars of 8 and 9 at one and five minutes.  Birth weight (GM): 2620 GM.  Fetal presentation: Vtx.  Position: AFUA  Amniotic fluid: clear.  Baby B Viable female infant at 1031 hours on 2017. Apgars of 8 and 9 at one and five minutes.  Birth weight (GM): 2370 GM.  Fetal presentation: footling breech.  Amniotic fluid: clear.   Placenta intact with 3 vessel cord.   Normal appearing uterus, fallopian tubes, ovaries.  No intraabdominal adhesions.  No abdominal wall adhesions      Indication: Evelyn Miner is a 26 year old,  , who was admitted at 38w0d by LMP c/w first tri ultrasound for PLTCS secondary to unstable lie of baby b. The risks, benefits, and alternatives of  delivery were explained and the patient agreed to proceed.     Procedure details:  After obtaining informed consent, the patient was taken to the operating room. She received adequate IV antibiotcs prior to the skin incision. She was placed in the dorsal supine position with a leftward tilt and prepped and draped in the usual sterile fashion. Following test of adequate spinal anesthesia, the abdomen was entered through a transverse skin incision. The skin incision was made sharply and carried through the subcutaneous tissue to the fascia.  Fascia was incised in the midline and extended laterally with the  Srinivasa scissors.  The superior margin of the fascial incision was grasped with Kocher clamps and dissected from the underlying muscle sharp and blunt dissecton, which was then repeated at the lower margin of the fascial incision.  The muscle was  in the midline.  The peritoneum was entered bluntly and the opening extended by digital dissection with care to avoid the bladder. A bladder blade was placed. The lower segment of the uterus was opened sharply in a transverse fashion and extended with digital pressure. Baby A The infant's head was elevated to the level of the hysterotomy and was delivered atraumatically. The cord was doubly clamped and cut and the infant was handed off to the waiting [facility] staff. A segment of the cord was cut and set aside for cord gases if needed. The placenta was manually extracted.  The uterus was exteriorized from the abdomen and cleared of all clots and debris.  The uterus was massaged and was noted to be firm.  Oxytocin was given through the running IV.  With vigorous massage as well as administration of oxytocin, good uterine tone was achieved. The hysterotomy was repaired with 0-vicryl suture in a running locked fashion. A 2nd layer of 0-monocryl was  used to imbricate the incision and good hemostasis was achieved. The bladder flap was inspected and found to be hemostatic.  The hysterotomy was again inspected and found to have no active sites of bleeding.  The abdominal wall was examined and found to have no active sites of bleeding.  The peritoneum was closed with running 3-0 vicryl.  The fascia was closed with a running suture of 0-vicryl.  Subcutaneous tissue was irrigated. Areas that were oozing were controlled with cautery. The subcutaneous tissue was > 3cm in depth and did require reapproximation with 3-0 vicryl. The skin was closed with 4-0 vicryl. The patient tolerated the procedure well and was taken to the recovery room in stable condition. All sponge, needle and  instrument counts were correct x2.     Humza Bustamante DO  March 3, 2017  [phone]

## 2017-03-04 LAB
ERYTHROCYTE [DISTWIDTH] IN BLOOD BY AUTOMATED COUNT: 14.7 % (ref 10–15)
HCT VFR BLD AUTO: 25.4 % (ref 35–47)
HGB BLD-MCNC: 8.3 G/DL (ref 11.7–15.7)
HGB BLD-MCNC: 8.4 G/DL (ref 11.7–15.7)
MCH RBC QN AUTO: 29.5 PG (ref 26.5–33)
MCHC RBC AUTO-ENTMCNC: 32.7 G/DL (ref 31.5–36.5)
MCV RBC AUTO: 90 FL (ref 78–100)
PLATELET # BLD AUTO: 206 10E9/L (ref 150–450)
RBC # BLD AUTO: 2.81 10E12/L (ref 3.8–5.2)
WBC # BLD AUTO: 11.7 10E9/L (ref 4–11)

## 2017-03-04 PROCEDURE — 25000132 ZZH RX MED GY IP 250 OP 250 PS 637: Performed by: OBSTETRICS & GYNECOLOGY

## 2017-03-04 PROCEDURE — 12000027 ZZH R&B OB

## 2017-03-04 PROCEDURE — 25000128 H RX IP 250 OP 636: Performed by: OBSTETRICS & GYNECOLOGY

## 2017-03-04 PROCEDURE — 99213 OFFICE O/P EST LOW 20 MIN: CPT

## 2017-03-04 PROCEDURE — 85027 COMPLETE CBC AUTOMATED: CPT | Performed by: OBSTETRICS & GYNECOLOGY

## 2017-03-04 PROCEDURE — 85018 HEMOGLOBIN: CPT | Performed by: OBSTETRICS & GYNECOLOGY

## 2017-03-04 PROCEDURE — 36415 COLL VENOUS BLD VENIPUNCTURE: CPT | Performed by: OBSTETRICS & GYNECOLOGY

## 2017-03-04 RX ORDER — FERROUS SULFATE 325(65) MG
325 TABLET ORAL 2 TIMES DAILY
Status: DISCONTINUED | OUTPATIENT
Start: 2017-03-04 | End: 2017-03-07 | Stop reason: HOSPADM

## 2017-03-04 RX ORDER — AMMONIA INHALANTS 0.04 G/.3ML
0.3 INHALANT RESPIRATORY (INHALATION)
Status: DISCONTINUED | OUTPATIENT
Start: 2017-03-04 | End: 2017-03-07 | Stop reason: HOSPADM

## 2017-03-04 RX ADMIN — SENNOSIDES AND DOCUSATE SODIUM 2 TABLET: 8.6; 5 TABLET ORAL at 18:32

## 2017-03-04 RX ADMIN — ACETAMINOPHEN 975 MG: 325 TABLET, FILM COATED ORAL at 19:54

## 2017-03-04 RX ADMIN — OXYCODONE HYDROCHLORIDE 5 MG: 5 TABLET ORAL at 13:53

## 2017-03-04 RX ADMIN — KETOROLAC TROMETHAMINE 30 MG: 30 INJECTION, SOLUTION INTRAMUSCULAR at 06:29

## 2017-03-04 RX ADMIN — OXYCODONE HYDROCHLORIDE 5 MG: 5 TABLET ORAL at 22:59

## 2017-03-04 RX ADMIN — OXYCODONE HYDROCHLORIDE 5 MG: 5 TABLET ORAL at 16:34

## 2017-03-04 RX ADMIN — KETOROLAC TROMETHAMINE 30 MG: 30 INJECTION, SOLUTION INTRAMUSCULAR at 00:34

## 2017-03-04 RX ADMIN — OXYCODONE HYDROCHLORIDE 5 MG: 5 TABLET ORAL at 19:53

## 2017-03-04 RX ADMIN — IBUPROFEN 800 MG: 400 TABLET ORAL at 18:32

## 2017-03-04 RX ADMIN — ACETAMINOPHEN 975 MG: 325 TABLET, FILM COATED ORAL at 12:27

## 2017-03-04 RX ADMIN — SENNOSIDES AND DOCUSATE SODIUM 1 TABLET: 8.6; 5 TABLET ORAL at 12:28

## 2017-03-04 RX ADMIN — IBUPROFEN 800 MG: 400 TABLET ORAL at 12:27

## 2017-03-04 RX ADMIN — ACETAMINOPHEN 975 MG: 325 TABLET, FILM COATED ORAL at 04:17

## 2017-03-04 RX ADMIN — IRON 325 MG: 65 TABLET ORAL at 19:53

## 2017-03-04 NOTE — LACTATION NOTE
Lactation in to assist with getting babies to breast. Maryjane twin 2 sga nurses well with swallows heard. Sugars at this time stable. Twin 1 Cato is a little sleepy at breast. Does well with stimulation. Questions asked. Mom not wanting to feed both babies at the same time as yet. Discussed possibility of needing to start pumping. Shown hand expressing while they are nursing. Encouraged to call prn.

## 2017-03-04 NOTE — PLAN OF CARE
Problem: Goal Outcome Summary  Goal: Goal Outcome Summary  Outcome: Improving     Dangle at bedside at 2100. Pt got dizzy and nausea. Assisted back to bed. Della care one while on bed. Turn sides to sides.  Pt will get up and ambulate in the morning. Good intake and output. + BS, + flatus.  Diet advanced to Regular. FOB here, supportive.

## 2017-03-04 NOTE — PROVIDER NOTIFICATION
03/04/17 1538   Provider Notification   Provider Name/Title Dr. Bustamante    Method of Notification Electronic Page   Notification Reason Medication Request       Pt request start on Iron , MD paged and ordered Ferrous Sulfate 325 mg  BID.

## 2017-03-04 NOTE — PROVIDER NOTIFICATION
03/04/17 1315   Provider Notification   Provider Name/Title Dr. Bustamante   Method of Notification Electronic Page   Request Evaluate-Remote   Notification Reason Lab Results;Status Update   MD update with CBC results HGB of 8.3, pt has been lightheaded and dizzy (stated she feels like fainting) while up to the bathroom. Also updated on urinary retention and straight cath x 2. MD recommended to place palma if retention continues. No new orders for medication intervention at this time. Will continue to monitor.

## 2017-03-04 NOTE — PROVIDER NOTIFICATION
03/04/17 0813   Provider Notification   Provider Name/Title Dr. Bustamante   Method of Notification At Bedside   MD at bedside; discussed HGB with pt as well as dizziness, plan is to recheck HGB later today. Will continue to monitor and assess.

## 2017-03-04 NOTE — PLAN OF CARE
Problem: Goal Outcome Summary  Goal: Goal Outcome Summary  Outcome: No Change  Pt Vitally stable. Hemoglobin was 8.3 this afternoon, stable from this AM. Up with assist of 1 due to pt feeling lightheaded and dizzy today when up to the bathroom x 3. Pt has urinary retention; she is able to feel her full bladder however unable to urinate, straight catheterized x 2 followed by palma placement. MD aware of pt status - see note. Dressing to incision is clean/dry/intact. Breast feeding. PCDs on while in bed. Appetite is good with adequate fluid intake. Tylenol, ibuprofen, and oxycodone provided for pain management. Will continue to monitor.

## 2017-03-04 NOTE — PLAN OF CARE
Problem: Goal Outcome Summary  Goal: Goal Outcome Summary  Outcome: Improving  VSS. IV saline locked. Pt dangled and stood at bedside, denies nausea or dizziness. UA catheter D/C'ed. Pt reports dizziness and nausea after getting up from the toilet. Care nurse lowered pt to the floor where she had a syncope episode for about 2-3 secs. Called another RN for help. Pt reports nausea and dizziness resolved after a few minutes. Offered medication for nausea but declined. Helped pt back to bed with assistance X1. Gave pt ice pack and crackers. Pt reports feeling better. Vitals WNL. Continue to monitor.

## 2017-03-04 NOTE — PROGRESS NOTES
"OB CS post op note  POD# 1    S:  Patient doing well.  Pain controlled with PO meds.  Rajiv crackers, Tyler out but has not voided yet Voiding, + ambulating,  Bleeding min.  Breast feeding.    O:  BP 93/57  Pulse 76  Temp 97.7  F (36.5  C) (Axillary)  Resp 18  Ht 1.676 m (5' 6\")  Wt 106.1 kg (233 lb 12.8 oz)  LMP 06/10/2016  SpO2 99%  Breastfeeding? Unknown  BMI 37.74 kg/m2    Intake/Output Summary (Last 24 hours) at 17 0831  Last data filed at 17 0700   Gross per 24 hour   Intake             2975 ml   Output             5900 ml   Net            -2925 ml      Gen- A&O, NAD  PULM: CTAB  CV: RRR  Abd- Non-tender, fundus firm at umbilicus  Incision: C/D/I with sutures bandage in place  Ext- non-tender, trace edema    Hemoglobin   Date Value Ref Range Status   2017 8.4 (L) 11.7 - 15.7 g/dL Final     A  Rubella Immune    A/P: 26 year old  POD# 1 s/p PLTCS for twins     1.  Routine post-partum cares.   - Pain PO meds    - Diet reg   - Ambulate    - IS as needed  2. Acute Blood loss anemia will recheck later today, the drop seems appropriate for 1500 cc blood loss  But she is having some lower BPs and feeling weak.  Normal pulse in bed, plenty of urine out put.  Discussed indications for transfusion  2.  Anticipate d/c home on POD# 3 or 4.    Humza Bustamante  3/4/2017  8:31 AM  "

## 2017-03-05 LAB
ANION GAP SERPL CALCULATED.3IONS-SCNC: 11 MMOL/L (ref 3–14)
BASOPHILS # BLD AUTO: 0 10E9/L (ref 0–0.2)
BASOPHILS NFR BLD AUTO: 0.3 %
BUN SERPL-MCNC: 7 MG/DL (ref 7–30)
CALCIUM SERPL-MCNC: 8.4 MG/DL (ref 8.5–10.1)
CHLORIDE SERPL-SCNC: 106 MMOL/L (ref 94–109)
CO2 SERPL-SCNC: 24 MMOL/L (ref 20–32)
CREAT SERPL-MCNC: 0.52 MG/DL (ref 0.52–1.04)
DIFFERENTIAL METHOD BLD: ABNORMAL
EOSINOPHIL # BLD AUTO: 0.1 10E9/L (ref 0–0.7)
EOSINOPHIL NFR BLD AUTO: 1.1 %
ERYTHROCYTE [DISTWIDTH] IN BLOOD BY AUTOMATED COUNT: 15.1 % (ref 10–15)
GFR SERPL CREATININE-BSD FRML MDRD: ABNORMAL ML/MIN/1.7M2
GLUCOSE BLDC GLUCOMTR-MCNC: 103 MG/DL (ref 70–99)
GLUCOSE SERPL-MCNC: 103 MG/DL (ref 70–99)
HCT VFR BLD AUTO: 26.5 % (ref 35–47)
HGB BLD-MCNC: 8.6 G/DL (ref 11.7–15.7)
IMM GRANULOCYTES # BLD: 0.1 10E9/L (ref 0–0.4)
IMM GRANULOCYTES NFR BLD: 0.9 %
LYMPHOCYTES # BLD AUTO: 4.1 10E9/L (ref 0.8–5.3)
LYMPHOCYTES NFR BLD AUTO: 36.1 %
MCH RBC QN AUTO: 29.4 PG (ref 26.5–33)
MCHC RBC AUTO-ENTMCNC: 32.5 G/DL (ref 31.5–36.5)
MCV RBC AUTO: 90 FL (ref 78–100)
MONOCYTES # BLD AUTO: 0.9 10E9/L (ref 0–1.3)
MONOCYTES NFR BLD AUTO: 7.7 %
NEUTROPHILS # BLD AUTO: 6.2 10E9/L (ref 1.6–8.3)
NEUTROPHILS NFR BLD AUTO: 53.9 %
NRBC # BLD AUTO: 0 10*3/UL
NRBC BLD AUTO-RTO: 0 /100
PLATELET # BLD AUTO: 239 10E9/L (ref 150–450)
POTASSIUM SERPL-SCNC: 3.7 MMOL/L (ref 3.4–5.3)
RBC # BLD AUTO: 2.93 10E12/L (ref 3.8–5.2)
SODIUM SERPL-SCNC: 141 MMOL/L (ref 133–144)
WBC # BLD AUTO: 11.5 10E9/L (ref 4–11)

## 2017-03-05 PROCEDURE — 36415 COLL VENOUS BLD VENIPUNCTURE: CPT | Performed by: OBSTETRICS & GYNECOLOGY

## 2017-03-05 PROCEDURE — 85025 COMPLETE CBC W/AUTO DIFF WBC: CPT | Performed by: OBSTETRICS & GYNECOLOGY

## 2017-03-05 PROCEDURE — 86901 BLOOD TYPING SEROLOGIC RH(D): CPT | Performed by: OBSTETRICS & GYNECOLOGY

## 2017-03-05 PROCEDURE — 25000132 ZZH RX MED GY IP 250 OP 250 PS 637: Performed by: OBSTETRICS & GYNECOLOGY

## 2017-03-05 PROCEDURE — 00000146 ZZHCL STATISTIC GLUCOSE BY METER IP

## 2017-03-05 PROCEDURE — 80048 BASIC METABOLIC PNL TOTAL CA: CPT | Performed by: INTERNAL MEDICINE

## 2017-03-05 PROCEDURE — 12000027 ZZH R&B OB

## 2017-03-05 PROCEDURE — 93010 ELECTROCARDIOGRAM REPORT: CPT | Performed by: INTERNAL MEDICINE

## 2017-03-05 PROCEDURE — 40000275 ZZH STATISTIC RCP TIME EA 10 MIN

## 2017-03-05 PROCEDURE — 86900 BLOOD TYPING SEROLOGIC ABO: CPT | Performed by: OBSTETRICS & GYNECOLOGY

## 2017-03-05 PROCEDURE — 25800025 ZZH RX 258: Performed by: OBSTETRICS & GYNECOLOGY

## 2017-03-05 PROCEDURE — 86850 RBC ANTIBODY SCREEN: CPT | Performed by: OBSTETRICS & GYNECOLOGY

## 2017-03-05 PROCEDURE — 99213 OFFICE O/P EST LOW 20 MIN: CPT

## 2017-03-05 PROCEDURE — 93005 ELECTROCARDIOGRAM TRACING: CPT

## 2017-03-05 RX ADMIN — SENNOSIDES AND DOCUSATE SODIUM 2 TABLET: 8.6; 5 TABLET ORAL at 19:22

## 2017-03-05 RX ADMIN — ACETAMINOPHEN 975 MG: 325 TABLET, FILM COATED ORAL at 12:17

## 2017-03-05 RX ADMIN — IRON 325 MG: 65 TABLET ORAL at 19:22

## 2017-03-05 RX ADMIN — OXYCODONE HYDROCHLORIDE 5 MG: 5 TABLET ORAL at 16:19

## 2017-03-05 RX ADMIN — IBUPROFEN 800 MG: 400 TABLET ORAL at 03:38

## 2017-03-05 RX ADMIN — SENNOSIDES AND DOCUSATE SODIUM 2 TABLET: 8.6; 5 TABLET ORAL at 09:23

## 2017-03-05 RX ADMIN — OXYCODONE HYDROCHLORIDE 5 MG: 5 TABLET ORAL at 07:43

## 2017-03-05 RX ADMIN — OXYCODONE HYDROCHLORIDE 5 MG: 5 TABLET ORAL at 19:23

## 2017-03-05 RX ADMIN — OXYCODONE HYDROCHLORIDE 5 MG: 5 TABLET ORAL at 22:16

## 2017-03-05 RX ADMIN — IBUPROFEN 800 MG: 400 TABLET ORAL at 22:16

## 2017-03-05 RX ADMIN — IRON 325 MG: 65 TABLET ORAL at 09:24

## 2017-03-05 RX ADMIN — OXYCODONE HYDROCHLORIDE 5 MG: 5 TABLET ORAL at 10:19

## 2017-03-05 RX ADMIN — ACETAMINOPHEN 975 MG: 325 TABLET, FILM COATED ORAL at 20:20

## 2017-03-05 RX ADMIN — IBUPROFEN 800 MG: 400 TABLET ORAL at 09:23

## 2017-03-05 RX ADMIN — ACETAMINOPHEN 975 MG: 325 TABLET, FILM COATED ORAL at 04:22

## 2017-03-05 RX ADMIN — OXYCODONE HYDROCHLORIDE 5 MG: 5 TABLET ORAL at 13:31

## 2017-03-05 RX ADMIN — IBUPROFEN 800 MG: 400 TABLET ORAL at 16:13

## 2017-03-05 RX ADMIN — SODIUM CHLORIDE, POTASSIUM CHLORIDE, SODIUM LACTATE AND CALCIUM CHLORIDE 1000 ML: 600; 310; 30; 20 INJECTION, SOLUTION INTRAVENOUS at 01:20

## 2017-03-05 NOTE — PROGRESS NOTES
March 5, 2017  DAILY NOTE - POSTOP DAY 2    SUBJECTIVE: Pain controlled now as had oxycodone about an hour ago. Pt had palma replaced at 250pm yesterday for urinary retention. She felt she had to go but could not. She had SC x 2, first for 900cc and second for 700cc, then palma replaced at 250pm and had 800cc out.  While up to BR she had vasovagal episode and felt better with smelling salts. Rapid response called and pt evaluated by the hospitalist. Hgb checked and stable at 8.6, other VSS as well. Pt denies dizziness now and feels well. She said that just before she fainted she felt more pain at her incision. We discussed importance of scheduled pain medication and perhaps her episode was a pain reaction.  She agrees and also wonders if she overdid it yesterday with activity and many visitors. Discussed OK to cut back on visitors today and rest more. She is tolerating a regular diet and passing flatus.    Pain controlled? Yes  Tolerating a regular diet? YES  Ambulating? YES  Voiding without difficulty? No: see above  Lochia? minimal  Breastfeeding:  yes      OBJECTIVE:  Vitals:    03/05/17 0330 03/05/17 0345 03/05/17 0400 03/05/17 0415   BP: 102/74 108/65 122/70 108/52   Pulse: 96 92 94 94   Resp:       Temp:       TempSrc:       SpO2:       Weight:       Height:           Intake/Output Summary (Last 24 hours) at 03/05/17 0915  Last data filed at 03/05/17 0700   Gross per 24 hour   Intake                0 ml   Output             6000 ml   Net            -6000 ml       Constitutional: healthy, alert and no distress  Abdomen:  Uterine fundus is firm, non-tender and at the level of the umbilicus -2  Incision: C/D/I bandage  Extremeties:  tr edema, non-tender bilaterally      LABS:  Hemoglobin   Date Value Ref Range Status   03/05/2017 8.6 (L) 11.7 - 15.7 g/dL Final   03/04/2017 8.3 (L) 11.7 - 15.7 g/dL Final     Lab Results   Component Value Date    RUBELLAABIGG 87 10/15/2010      Lab Results   Component Value Date     ABO A 03/02/2017           Lab Results   Component Value Date    RH  Pos 03/02/2017        Recent Labs  Lab 03/05/17  0124 03/04/17  1247 03/04/17  0715   WBC 11.5* 11.7*  --    HGB 8.6* 8.3* 8.4*   HCT 26.5* 25.4*  --    MCV 90 90  --     206  --           Lab Results   Component Value Date     03/05/2017     01/06/2017     08/23/2016    Lab Results   Component Value Date    CHLORIDE 106 03/05/2017    CHLORIDE 105 01/06/2017    CHLORIDE 102 08/23/2016    Lab Results   Component Value Date    BUN 7 03/05/2017    BUN 6 01/06/2017    BUN 8 08/23/2016      Lab Results   Component Value Date    POTASSIUM 3.7 03/05/2017    POTASSIUM 3.8 01/06/2017    POTASSIUM 3.3 08/23/2016    Lab Results   Component Value Date    CO2 24 03/05/2017    CO2 22 01/06/2017    CO2 25 08/23/2016    Lab Results   Component Value Date    CR 0.52 03/05/2017    CR 0.56 01/06/2017    CR 0.50 08/23/2016          ASSESSMENT:   POD #2  Primary LTCS for vtx/breech twins    Urinary retention yesterday with palma replaced  Vasovagal episode resolved now and pt hemodynamically stable  Mild acute blood loss anemia with stable hgb. Pt on Fe sulfate BID.       PLAN:      Routine postop cares  Discussed urinary retention and plan to D/C palma at 3pm after 24hours of bladder rest and then trial of voids  Vasovagal episode discussed in detail and will observe for now. She will schedule pain medications and will limit visitors so she can get more rest.   Questions answered.  Anticipate D/C home on POD#4        Mima Mendes MD

## 2017-03-05 NOTE — PLAN OF CARE
Problem: Goal Outcome Summary  Goal: Goal Outcome Summary  Outcome: Improving  Pt Vital signs stable. Pt stated she feels slightly dizzy at times when sitting upright. Pt was able to ambulate to the bathroom with stand by assist; shower taken this shift without complications. Plan is for palma removal after 3 pm. Pain is being well managed with tylenol, ibuprofen, oxycodone, and abdominal binder. Incentive spirometer provided. Pt is breastfeeding, pumping and supplementing with 15-20cc of formula for both baby 1 & 2. Adequate support provided by pt mother and spouse. Plan is for discharge POD4, peds aware. Will continue with plan of care.

## 2017-03-05 NOTE — PROGRESS NOTES
RRT    RRT called as patient had a syncopal episode while in the bathroom.  When I arrived she was sitting on the toilet.  She was awake but extremely pale and slightly diaphoretic.  She was able to state that she felt lightheaded and had abdominal pain (stable from earlier this evening) but denies CP or SOB.  She had a blood pressure of 83 systolic.  She was assisted to bed and blood pressure repeated high 70s systolic.  EKG obtained which showed sinus tachycardia with no ischemia.  IV was started and 1 L fluid bolus ordered.  Blood drawn, Hgb earlier today was 8.3.  She started to feel better when lying flat and color improved.  SBP improved to 93.      CBC and BMP ordered.  Nurse did discuss the case with OB on call.  Will ensure that Hgb has not dropped significantly but this could certainly be orthostatic hypotension vs vasovagal as she has been feeling lightheaded when standing throughout the day and has soft blood pressures.    Patient was alert and able to answer questions, feeling much better after IVF started.  Exam reveals slightly tender abdomen, mild tachycardia and clear lungs.    Randee Faye MD

## 2017-03-05 NOTE — LACTATION NOTE
Lactation in to see patient. Patient states that both babies have had a couple of feeds where they were attempts. Discussed plan to start pumping after nursing to increase supply. Maryjane continues to nurse well, some swallows heard. Is still fussy after nursing. Bedside nurse discussed supplementing after nursing, mother agreeable with plan. Obdulia continues to be sleepy at breast. Writer was able with the first feed to get baby awake to nurse well. Plan to supplement this baby after nursing as well. Patient pumping with no results yet. Encouraged to call prn.

## 2017-03-05 NOTE — PLAN OF CARE
Problem: Goal Outcome Summary  Goal: Goal Outcome Summary        Pt started breastpumping this shift and ok with formula supplementation.  Using nipple cream for sore nipple.  Taking Ibuprofen/oxycodone/tylenol  for pain . Still has palma catheter drainingg large amount of clear urine. No dizziness at this time. But will try to get pt up and assess for dizziness. Pt has lots of visitors at this time.

## 2017-03-05 NOTE — PLAN OF CARE
"Problem: Goal Outcome Summary  Goal: Goal Outcome Summary  Outcome: Improving  Pt had a syncope episode while up to the bathroom. Pt was on the toilet when it happened. Gave some smelling salt. Reports dizziness, \"feeling heavy\", and lower abdominal pain from the incision site. RRT was called after pt was unable to get out of the episode compared to the first syncope episode from the day before. Blood glucose was 103. First BP reading was 86/47. Assisted X1 with lluvia pedro to bed. IV was started and 1L fluid bolus given. Systolic BP was in 100-110 range, see flowsheet. Dr Bustamante was notified of changes to pt. Pt's condition improved. Her twin infants were sent to the nursery. Pt resting comfortably. Pain controlled with ibuprofen and tylenol. FOB at bedside.       "

## 2017-03-05 NOTE — PROVIDER NOTIFICATION
03/05/17 0123   Provider Notification   Provider Name/Title Dr martinez   Method of Notification Phone   Request Evaluate-Remote   Notification Reason Status Update;Other   Wants CBC, BMP and type and screen drawn. Wants to be notified if Hemoglobin is 1g less than the last hgb from 1247, and CT of the abdomen to rule out bleeding or clots

## 2017-03-05 NOTE — PROVIDER NOTIFICATION
03/05/17 0222   Provider Notification   Provider Name/Title Dr Bustamante   Method of Notification Phone   Request Evaluate-Remote   Notification Reason Lab Results;Status Update     Update provider regarding lab results and pt's condition.

## 2017-03-06 LAB
ALBUMIN UR-MCNC: NEGATIVE MG/DL
APPEARANCE UR: CLEAR
BILIRUB UR QL STRIP: NEGATIVE
COLOR UR AUTO: ABNORMAL
GLUCOSE UR STRIP-MCNC: NEGATIVE MG/DL
HGB UR QL STRIP: ABNORMAL
INTERPRETATION ECG - MUSE: NORMAL
KETONES UR STRIP-MCNC: NEGATIVE MG/DL
LEUKOCYTE ESTERASE UR QL STRIP: NEGATIVE
MUCOUS THREADS #/AREA URNS LPF: PRESENT /LPF
NITRATE UR QL: NEGATIVE
PH UR STRIP: 7 PH (ref 5–7)
RBC #/AREA URNS AUTO: 7 /HPF (ref 0–2)
SP GR UR STRIP: 1.01 (ref 1–1.03)
SQUAMOUS #/AREA URNS AUTO: 1 /HPF (ref 0–1)
URN SPEC COLLECT METH UR: ABNORMAL
UROBILINOGEN UR STRIP-MCNC: 0 MG/DL (ref 0–2)
WBC #/AREA URNS AUTO: 1 /HPF (ref 0–2)

## 2017-03-06 PROCEDURE — 25000132 ZZH RX MED GY IP 250 OP 250 PS 637: Performed by: OBSTETRICS & GYNECOLOGY

## 2017-03-06 PROCEDURE — 12000027 ZZH R&B OB

## 2017-03-06 PROCEDURE — 81001 URINALYSIS AUTO W/SCOPE: CPT | Performed by: OBSTETRICS & GYNECOLOGY

## 2017-03-06 RX ADMIN — OXYCODONE HYDROCHLORIDE 5 MG: 5 TABLET ORAL at 05:50

## 2017-03-06 RX ADMIN — IBUPROFEN 800 MG: 400 TABLET ORAL at 10:03

## 2017-03-06 RX ADMIN — IRON 325 MG: 65 TABLET ORAL at 20:04

## 2017-03-06 RX ADMIN — IBUPROFEN 800 MG: 400 TABLET ORAL at 04:04

## 2017-03-06 RX ADMIN — OXYCODONE HYDROCHLORIDE 5 MG: 5 TABLET ORAL at 02:25

## 2017-03-06 RX ADMIN — ACETAMINOPHEN 975 MG: 325 TABLET, FILM COATED ORAL at 04:04

## 2017-03-06 RX ADMIN — IRON 325 MG: 65 TABLET ORAL at 09:26

## 2017-03-06 RX ADMIN — IBUPROFEN 800 MG: 400 TABLET ORAL at 16:58

## 2017-03-06 RX ADMIN — OXYCODONE HYDROCHLORIDE 5 MG: 5 TABLET ORAL at 17:51

## 2017-03-06 RX ADMIN — OXYCODONE HYDROCHLORIDE 5 MG: 5 TABLET ORAL at 13:54

## 2017-03-06 RX ADMIN — IBUPROFEN 400 MG: 400 TABLET ORAL at 23:24

## 2017-03-06 RX ADMIN — SENNOSIDES AND DOCUSATE SODIUM 2 TABLET: 8.6; 5 TABLET ORAL at 09:25

## 2017-03-06 RX ADMIN — ACETAMINOPHEN 975 MG: 325 TABLET, FILM COATED ORAL at 11:51

## 2017-03-06 RX ADMIN — ACETAMINOPHEN 650 MG: 325 TABLET, FILM COATED ORAL at 20:04

## 2017-03-06 RX ADMIN — OXYCODONE HYDROCHLORIDE 5 MG: 5 TABLET ORAL at 09:26

## 2017-03-06 RX ADMIN — SENNOSIDES AND DOCUSATE SODIUM 2 TABLET: 8.6; 5 TABLET ORAL at 20:04

## 2017-03-06 NOTE — PROGRESS NOTES
I called in to RN for update:    1.  RE: Urinary retention  Palma removed at 530pm with 800cc in the bag  Spontaneous 200cc void at 840pm  Spontaneous 180cc void at 1130pm  I asked RN to have Evelyn attempt to revoid no later than 3 hours from now and do bladder scan after her void. If >300cc urine remains in bladder then replace palma.    2. RE: vasovagal episodes yesterday  No further vasovagal episodes today. Pt has been up and out of bed multiple times and in shower with no problems.    MD Travis

## 2017-03-06 NOTE — LACTATION NOTE
ALEX to see patient and assist with latch.  Baby 1 is sleepy at breast and has a tight frenulum.  Patient has used a nipple shield due to pain with her latch, however has not been using it and both babies latch easily.  She also uses SNS at times.  With this feeding, LC assisted with tandem feeding and both babies nursed with constant stimulation.  She supplemented after with finger feeding of formula.  Hand expression was encouraged post feeds as well as 10 minutes of pumping when able.  Baby 2 nursed well with stimulation and some swallows noted.  Plan for continued support, assistance with feeds, and stimulating milk production.  Formula supplements indicated.  Possible follow up outpatient visit when her milk is in to assess milk transfer.

## 2017-03-06 NOTE — PLAN OF CARE
Problem: Goal Outcome Summary  Goal: Goal Outcome Summary  Outcome: Improving, voided . Independent with cares for self. Pain managed with ibuprofen, tylenol and oxycodone. Breas  VSS. Denies dizziness or nausea. Pt up to the bathroom with adequate output. Independent with cares for self. Pain managed with ibuprofen, tylenol and oxycodone. Breastfeeding, pumping and supplementing formula. IV D/C.

## 2017-03-06 NOTE — LACTATION NOTE
Lactation in to see patient. Patient states Maryjane continues to nurse well, and then she is supplemented with formula. [place] is still sleepy at breast. At time of visit Obdulia ready to eat. Baby is tongue tied, but latched well with not much sucking. sns done, baby took 20 cc's well. Mom pumping after nursing with not much results. Mom wanting to sns with Maryjane with next feed. Will call prn

## 2017-03-06 NOTE — PLAN OF CARE
Problem: Goal Outcome Summary  Goal: Goal Outcome Summary  Outcome: No Change  Fundal assessments WNL, bleeding scant, incision healing well, VSS. Pt reports burning with urination- to collect urine culture. Pt has had two dizzy spells throughout shift- one while standing and the other while laying in bed- see respective notes. Pt needs reinforcement about self-care. Pt not recognizing when she is hungry until she feels unwell. CTM

## 2017-03-06 NOTE — PLAN OF CARE
Problem: Goal Outcome Summary  Goal: Goal Outcome Summary        Assisted to bathroom with 2 staff. NO dizziness, pt look pink. Pt attempted to void, water trickle &  pepperment oil used , pt only peed small amount.  Will try again later.

## 2017-03-06 NOTE — PROGRESS NOTES
March 6, 2017  Caes[facility] Section Daily Progress NOTE - PostOp DAY 3    SUBJECTIVE: feeling better; now voiding but some pain with emptying bladder; no further vaso-vagal episodes; babies slow to nurse    Pain controlled? Yes  Tolerating a regular diet? YES  Ambulating? YES  Voiding without difficulty? Yes  Lochia? minimal  Breastfeeding:  yes      OBJECTIVE:  Vitals:    03/05/17 0415 03/05/17 0923 03/05/17 1706 03/06/17 0246   BP: 108/52 116/69 110/62 105/63   Pulse: 94 104 97 95   Resp:  18 16 16   Temp:  98.1  F (36.7  C) 98.2  F (36.8  C) 97.9  F (36.6  C)   TempSrc:  Oral Oral Oral   SpO2:       Weight:       Height:           Constitutional: Healthy, alert and in no distress.     Abdomen: Uterine fundus is firm, non-tender and at the level of the umbilicus  Incision: Clean, Dry and Intact    Extremeties:  +1 edema, non-tender      LABS:  Hemoglobin   Date Value Ref Range Status   03/05/2017 8.6 (L) 11.7 - 15.7 g/dL Final   03/04/2017 8.3 (L) 11.7 - 15.7 g/dL Final     Lab Results   Component Value Date    RUBELLAABIGG 87 10/15/2010      Lab Results   Component Value Date    ABO A 03/02/2017           Lab Results   Component Value Date    RH  Pos 03/02/2017          ASSESSMENT:   Post Op Day: 3  Primary LTCS - twins at 38+0    Urinary retention resolved  Episodes vasovagal x2; vitals stable and Hbg stable       PLAN:      Plan to send UA/UCx given multiple catheterizations and c/o dysuria  Recommend discharge tomorrow       Jenny Munguia

## 2017-03-06 NOTE — PROGRESS NOTES
RN called to bedside. Pt reports that she felt dizzy and lightheaded when she stood up. Pt requested RN take her BP. BP WNL. RN inquired about circumstances and pt stated she has not been drinking enough fluid and had not eaten yet. Pt reports not sleeping well last night. RN instructed pt to drink pitcher of water before noon and to eat breakfast. Pt instructed to call RN next time she ambulates. Pt to take nap with babies in nursery after next feed. KIRBY

## 2017-03-06 NOTE — PLAN OF CARE
Problem: Postpartum ( Delivery) (Adult)  Goal: Signs and Symptoms of Listed Potential Problems Will be Absent or Manageable (Postpartum)  Signs and symptoms of listed potential problems will be absent or manageable by discharge/transition of care (reference Postpartum ( Delivery) (Adult) CPG).   Pt continues to have dizzy spells and pain with urination. Will ctm and get urinalysis

## 2017-03-06 NOTE — PROVIDER NOTIFICATION
03/06/17 1426   Vitals   /66   Heart Rate 109   SpO2 100 %     RN called to bedside because pt reported feeling dizzy and lightheaded. Pt placed in recumbent position. Pt visibly diaphoretic and pale. Vitals were as listed above. Pt reports no precipitating factors and was not moving or trying to get out of bed. Pt reports it has been more than 4 hours since her last meal. Peanut butter toast and juice provided. Pt instructed to eat and order lunch. Pt to call RN before getting out of bed. Will continue to reinforce self-care and ctm pt.

## 2017-03-06 NOTE — PLAN OF CARE
Problem: Goal Outcome Summary  Goal: Goal Outcome Summary  Outcome: Improving        Pt got up and ambulated to bathroom . Pt able to void 200cc of urine.  No dizziness  Noted.

## 2017-03-06 NOTE — PLAN OF CARE
Problem: Goal Outcome Summary  Goal: Goal Outcome Summary  Outcome: No Change        Tyler catheter removed at 1730. Instructed to call when going up to bathroom. Call light within reach. Taking pain meds for discomfort. Blood pressure WDL. Pt still has saline locked.

## 2017-03-07 VITALS
OXYGEN SATURATION: 100 % | TEMPERATURE: 98.4 F | HEART RATE: 95 BPM | SYSTOLIC BLOOD PRESSURE: 105 MMHG | WEIGHT: 233.8 LBS | HEIGHT: 66 IN | DIASTOLIC BLOOD PRESSURE: 69 MMHG | RESPIRATION RATE: 18 BRPM | BODY MASS INDEX: 37.57 KG/M2

## 2017-03-07 PROCEDURE — 25000132 ZZH RX MED GY IP 250 OP 250 PS 637: Performed by: OBSTETRICS & GYNECOLOGY

## 2017-03-07 RX ORDER — OXYCODONE HYDROCHLORIDE 5 MG/1
5-10 TABLET ORAL
Qty: 20 TABLET | Refills: 0 | Status: SHIPPED | OUTPATIENT
Start: 2017-03-07 | End: 2018-01-30

## 2017-03-07 RX ORDER — AMOXICILLIN 250 MG
1-2 CAPSULE ORAL 2 TIMES DAILY PRN
Qty: 40 TABLET | Refills: 0 | Status: SHIPPED | OUTPATIENT
Start: 2017-03-07 | End: 2018-01-30

## 2017-03-07 RX ADMIN — IRON 325 MG: 65 TABLET ORAL at 07:30

## 2017-03-07 RX ADMIN — OXYCODONE HYDROCHLORIDE 5 MG: 5 TABLET ORAL at 07:30

## 2017-03-07 RX ADMIN — ACETAMINOPHEN 650 MG: 325 TABLET, FILM COATED ORAL at 11:16

## 2017-03-07 RX ADMIN — OXYCODONE HYDROCHLORIDE 5 MG: 5 TABLET ORAL at 11:16

## 2017-03-07 RX ADMIN — BISACODYL 10 MG: 10 SUPPOSITORY RECTAL at 11:47

## 2017-03-07 RX ADMIN — SENNOSIDES AND DOCUSATE SODIUM 2 TABLET: 8.6; 5 TABLET ORAL at 07:30

## 2017-03-07 RX ADMIN — OXYCODONE HYDROCHLORIDE 5 MG: 5 TABLET ORAL at 00:18

## 2017-03-07 RX ADMIN — ACETAMINOPHEN 650 MG: 325 TABLET, FILM COATED ORAL at 00:18

## 2017-03-07 RX ADMIN — IBUPROFEN 800 MG: 400 TABLET ORAL at 06:42

## 2017-03-07 RX ADMIN — ACETAMINOPHEN 650 MG: 325 TABLET, FILM COATED ORAL at 07:30

## 2017-03-07 NOTE — PLAN OF CARE
Problem: Goal Outcome Summary  Goal: Goal Outcome Summary  Outcome: Improving  Data: Vital signs within normal limits. Postpartum checks within normal limits - see flow record. Patient eating and drinking normally. Patient able to empty bladder independently and is up ambulating. No apparent signs of infection. Incision healing well. Patient performing self cares and is able to care for infant.  Action: Patient medicated during the shift for cramping. See MAR. Patient reassessed within 1 hour after each medication and pain was improved - patient stated she was comfortable. Patient education done. See flow record.  Response: Positive attachment behaviors observed with infant. Support person present.   Plan: Anticipate discharge today.

## 2017-03-07 NOTE — PLAN OF CARE
Problem: Goal Outcome Summary  Goal: Goal Outcome Summary  Outcome: Improving  Stable patient meeting expected goals.  Medicated with ibuprofen, tylenol and oxycodone this shift for pain.  Incision healing well with scant amount of dried drainage and steri strips intact.    Able to empty bladder without difficulty.  Up and ambulating in room denies dizziness or weakness when up.  Independent with self and  care.  Pt asking appropriate questions and states understanding of teaching.

## 2017-03-07 NOTE — PLAN OF CARE
Problem: Goal Outcome Summary  Goal: Goal Outcome Summary  Outcome: Improving  Data: Vital signs within normal limits. Postpartum checks within normal limits - see flow record. Patient eating and drinking normally. Patient able to empty bladder independently and is up ambulating. No apparent signs of infection. Incision healing well. Patient performing self cares and is able to care for infants.  Action: Patient medicated during the shift for pain. See MAR. Patient reassessed within 1 hour after each medication and pain was improved - patient stated she was comfortable. Patient education done about postpartum cares. See flow record.  Response: Positive attachment behaviors observed with infant. Support persons Pipo present.   Plan: Anticipate discharge on 3/7/17.

## 2017-03-07 NOTE — DISCHARGE INSTRUCTIONS
Postop  Birth Instructions  Lactation [phone]    Activity       Do not lift more than 10 pounds for 6 weeks after surgery.  Ask family and friends for help when you need it.    No driving until you have stopped taking your pain medications (usually two weeks after surgery).    No heavy exercise or activity for 6 weeks.  Don't do anything that will put a strain on your surgery site.    Don't strain when using the toilet.  Your care team may prescribe a stool softener if you have problems with your bowel movements.     To care for your incision:       Keep the incision clean and dry.    Do not soak your incision in water. No swimming or hot tubs until it has fully healed. You may soak in the bathtub if the water level is below your incision.    Do not use peroxide, gel, cream, lotion, or ointment on your incision.    Adjust your clothes to avoid pressure on your surgery site (check the elastic in your underwear for example).     You may see a small amount of clear or pink drainage and this is normal.  Check with your health care provider:       If the drainage increases or has an odor.    If the incision reddens, you have swelling, or develop a rash.    If you have increased pain and the medicine we prescribed doesn't help.    If you have a fever above 100.4 F (38 C) with or without chills when placing thermometer under your tongue.   The area around your incision (surgery wound), will feel numb.  This is normal. The numbness should go away in less than a year.     Keep your hands clean:  Always wash your hands before touching your incision (surgery wound). This helps reduce your risk of infection. If your hands aren't dirty, you may use an alcohol hand-rub to clean your hands. Keep your nails clean and short.    Call your healthcare provider if you have any of these symptoms:       You soak a sanitary pad with blood within 1 hour, or you see blood clots larger than a golf ball.    Bleeding that lasts  more than 6 weeks.    Vaginal discharge that smells bad.    Severe pain, cramping or tenderness in your lower belly area.    A need to urinate more frequently (use the toilet more often), more urgently (use the toilet very quickly), or it burns when you urinate.    Nausea and vomiting.    Redness, swelling or pain around a vein in your leg.    Problems breastfeeding or a red or painful area on your breast.    Chest pain and cough or are gasping for air.    Problems with coping with sadness, anxiety or depression. If you have concerns about hurting yourself or the baby, call your provider immediately.      You have questions or concerns after you return home.

## 2017-03-07 NOTE — PROGRESS NOTES
"OB Post-op  Section Progress Note POD# 4    S:  Patient doing well.  Pain well controlled with oral pain medication.  Ambulating.  Tolerating regular diet.  No N/V.  Passing flatus.  Voiding.  Bleeding is normal.  Breastfeeding.    O:  /68  Pulse 95  Temp 97.8  F (36.6  C) (Oral)  Resp 18  Ht 1.676 m (5' 6\")  Wt 106.1 kg (233 lb 12.8 oz)  LMP 06/10/2016  SpO2 100%  Breastfeeding? Yes BMI 37.74 kg/m2  Gen- A&O, NAD  Abd- soft, non-tender, no rebound or guarding, fundus firm at umbilicus  Incision- C/D/I  Ext- non-tender, no edema.     Hemoglobin   Date Value Ref Range Status   2017 8.6 (L) 11.7 - 15.7 g/dL Final     A +  Rubella immune    A/P:  26 year old  POD# 4 s/p primary LTCS for twins.    1.  Routine post-op cares  2.  Analgesia - pain controlled with oral meds.  3.  Acute blood loss anemia - Fe supplement.  No symptoms.  4.  Discharge today.  Rx for oxycodone, stool softner and Fe supplement.  5.  The plan of care was discussed with the patient.  She expressed understanding and agreement.   6.  Post operative instructions and indications to call or return were discussed.      Diya Quinones  3/7/2017  8:03 AM   "

## 2017-03-07 NOTE — PLAN OF CARE
Discharge instructions reviewed by THOMAS Tomlin and all questions answered. Home meds of Iron, Senna, and Oxy given to pt at discharge. Pt given Rx for breast pump so she can submit to insurance. Mom and twins discharged home at 1225.

## 2017-03-10 ENCOUNTER — HOSPITAL ENCOUNTER (OUTPATIENT)
Dept: OBGYN | Facility: CLINIC | Age: 27
Discharge: HOME | End: 2017-03-10
Attending: OBSTETRICS & GYNECOLOGY | Admitting: OBSTETRICS & GYNECOLOGY
Payer: COMMERCIAL

## 2017-03-10 PROCEDURE — 99215 OFFICE O/P EST HI 40 MIN: CPT

## 2017-03-10 NOTE — CONSULTS
LACTATION CONSULT/PHYSICIAN REPORT  [facility]       MOTHER    Doctor: Stacie     MOTHER'S CONCERNS: Milk transfer at breast to see need to continue to supplement    Medical History: None    Pregnancy History/Breastfeeding History  G 2 P 3 This pregnancy went well. Stopped working at 34 weeks.  Breastfeeding did not go well with 1st baby. Mom got lots of conflicting information and quit when she got home     Delivery History  Primary C/Section  Breech 2nd Twin    Labor Meds/Anesthesia  Spinal    Current Medications/Herbals  Prenatal vitamins, Ibuprofen and Tylenol, iron, stool softener, Oxycodone 5 mg 2 - 3 x/day    ASSESSMENT OF MOTHER    Physical: Tired and pain between shoulder blades    Milk Supply: WNL for age    PUMPING: Pump in Style pumps 70 - 80 cc each time. Pumps every time babies nurse even at night    BABY: Obdulia #1  Age: 7 days Birth Date: 3/3/17 Gestational Age: 38     Maryjane #2    Doctor: Cathie    Complications of Birth: None for either baby (Castor was tongue tied - released in the hospital    Breastfeeding/hospital: Other: Issues with both needing supplement and Obdulia had more sucking issues     ASSESSMENT OF BABY    Physical: WNL for both    SUPPLEMENTATION: Expressed Breast Milk (EBM), Amount: both get supplement after nursing 25 cc each by finger feeding    OUTPUT:  Lots of wets and stools that are good sizes and esquivel brown with green. Maryjane stools more than Obdulia      BABY'S WEIGHT HISTORY  Obdulia       At Delivery:  Date: 3/3/17 Weight: 5-12.4   3/3/2017 3/3/2017 3/4/2017 3/5/2017 3/6/2017   Birth Weight 5 lb 12.4 oz 5 lb 12.4 oz      R/C Weights 5 lb 12.4 oz 5 lb 11 oz 5 lb 5 oz 5 lb 5 oz 5 lb 3.6 oz      3/6/2017   Birth Weight    R/C Weights 5 lb 4.1 oz   Age: 5 days  Date: 3/8 Weight: 5-4.6   Age: 7 days  Date: 3/10 Weight: 5-4.4      Maryjane   At Delivery  Date: 3/3/17   Weight: 5-3.6   3/3/2017 3/3/2017 3/4/2017 3/5/2017 3/6/2017   Birth  Weight 5 lb 3.6 oz 5 lb 3.6 oz      R/C Weights 5 lb 3.6 oz 5 lb 4 lb 13 oz 4 lb 13 oz 4 lb 12 oz      3/6/2017   Birth Weight    R/C Weights 4 lb 12.2 oz   Age: 5 days  Date: 3/8 Weight: 4-12.5  Age: 7 days  Date: 3/10 Weight: 4-13.8    FEEDING ASSESSMENT    Mom has a little pain with latch if she does not use the nipple shield. Obdulia did latch and NW, Maryjane was sleepy and took longer to nurse    INTERVENTIONS/EVALUATION:  Cross Cradle, Asymmetric Latch, Breast Compression and Shield works well for both. Breast compression kept them sucking better    WEIGHT GAIN AT BREAST: (NOTE: 30cc = 1 oz):  At current weight, baby #1 needs approximately 14 oz in 24 hours or 1 3/4 oz every 3 hours (53 cc)         Baby #2 needs approximately 13 oz in 24 hours or 1.6 oz every 3 hours (49 cc)    Number order of breastfeeding  [place]  16 cc LEFT breast after 15 minutes then took 45 cc from the bottle  Total of 63 cc  Maryjane    14 cc  RIGHT breast after 30 minutes then 45 cc from the bottle  Total of 59 cc    SUMMARY  Babies are still needing supplement after nursing. Obdulia only got 16 cc from the breast with both breast compression. Maryjane was more sleepy and had been fed 2 hours before this visit. She nursed longer but got less and was sleepy for the bottle. Both babies should be more active and better at the breast as they start gaining weight. Enc use of bottles rather than finger feeding for time and better wt gain.     RECOMMENDATIONS  1. Hold feedings at the breast to about 15 minutes each baby  2. Breast compressions throughout the feeding. Squeeze while baby sucks, let up when baby pauses and repeat moving the thumb to another area. This will get more milk to baby and speed the feeding along faster.   3. Since both babies got about 1/2 oz, they will need the rest from the bottle - Obdulia needs 53 cc total each feeding and Maryjane needs about 49 cc total each feeding  4. Continue pumping after feedings (skip one  pumping at night)  5. They should be more active once they are gaining more and progress faster  6. I will call Monday for breastfeeding progress     Follow up: Patient to call lactation consultant if questions arise and I will call in 1 week    Next visit/Phone call: Doctor: next on Monday morning    Lactation Consultant: Julia Miller RN  Date: 3/10/2017    Start: 1101    End: 1234    60 minute Lactation Consultation with > 50% of time spent on breastfeeding counseling and coordination of care.    [facility] LACTATION OFFICE  PHONE: [phone] FAX: [phone]    Note: noticed mom was using a 16 mm nipple shield and it was a tight fit. Mom went home with 20 mm shields and babies should get more volume at the breast

## 2017-03-14 ENCOUNTER — TELEPHONE (OUTPATIENT)
Dept: OBGYN | Facility: CLINIC | Age: 27
End: 2017-03-14

## 2017-04-15 NOTE — DISCHARGE SUMMARY
Hospital Discharge Summary      Date of admission: 3/3/2017  Date of discharge: 3/7/2017    Admission Dx:   1. Intrauterine pregnancy at 38w0d  2. Di/di twin gestation unstable lie of twin     Discharge Dx:  1. Acute blood loss anemia    Hospital Procedures:   - PLTCS    Brief HPI:  Evelyn Miner is a 26 year old,  , who was admitted at 38w0d by LMP c/w first tri ultrasound for PLTCS secondary to unstable lie of baby b. The risks, benefits, and alternatives of  delivery were explained and the patient agreed to proceed.  Her past medical history is complicated by nothing.  Please see her preoperative H&P for full details of her history.    Hospital Course:  On 3/3/2017, Evelyn Miner underwent a PLTCS.  The procedure was complicated by a large EBL.  EBL from the procedure was 1500.  Findings at the time of the procedure included Baby A Viable female infant at 1030 hours on 2017. Apgars of 8 and 9 at one and five minutes. Birth weight (GM): 2620 GM. Fetal presentation: Vtx. Position: AFUA Amniotic fluid: clear. Baby B Viable female infant at 1031 hours on 2017. Apgars of 8 and 9 at one and five minutes. Birth weight (GM): 2370 GM. Fetal presentation: footling breech. Amniotic fluid: clear.  Placenta intact with 3 vessel cord. Normal appearing uterus, fallopian tubes, ovaries. No intraabdominal adhesions. No abdominal wall adhesions.  For full details of the procedure, please see her operative note.      Her postoperative course was complicated by acute blood loss anemia, and urinary retention .  Her preoperative hgb was 11.2 and her postoperative hgb was 8.6.  She was asymptomatic from this drop.  By POD#4, she was ambulating without dizziness, tolerating a regular diet without nausea or vomiting, passing flatus, and her pain was well controlled on oral pain meds.  She requested discharge to home and was deemed appropriate and stable to do so.        Discharge instructions:    She was  discharged to home on POD#4 with the following instructions:   - No lifting > 20 lbs x 6 weeks   - Nothing in the vagina x 6 weeks   - No driving while on narcotic meds or for 2 weeks   - Call the clinic if you have a temp > 100.4, heavy vaginal bleeding, pain not controlled with oral pain meds, severe constipation, severe nausea or vomiting, or abnormal drainage from your incision or your vagina.    Discharge meds:  The patient was discharged to home with the following pain medications:   Evelyn Miner   Home Medication Instructions CHIQUI:[id number]    Printed on:04/14/17 2009   Medication Information                      ferrous sulfate (SLO-FE) 142 (45 FE) MG TBCR  Take 1 tablet (142 mg) by mouth daily             oxyCODONE (ROXICODONE) 5 MG IR tablet  Take 1-2 tablets (5-10 mg) by mouth every 3 hours as needed for moderate to severe pain             Prenatal Vit-Fe Fumarate-FA (PRENATAL MULTIVITAMIN  PLUS IRON) 27-0.8 MG TABS  Take 1 tablet by mouth daily             senna-docusate (SENOKOT-S;PERICOLACE) 8.6-50 MG per tablet  Take 1-2 tablets by mouth 2 times daily as needed for constipation                 The patient was instructed to continue her home meds as previously prescribed by her primary MD.    Follow-up:  The patient was instructed to follow-up with Dr. Bustamante in 6 weeks for a routine post-operative visit.    Humza Bustamante

## 2017-06-24 ENCOUNTER — HEALTH MAINTENANCE LETTER (OUTPATIENT)
Age: 27
End: 2017-06-24

## 2018-01-30 ENCOUNTER — OFFICE VISIT (OUTPATIENT)
Dept: URGENT CARE | Facility: URGENT CARE | Age: 28
End: 2018-01-30
Payer: COMMERCIAL

## 2018-01-30 VITALS
BODY MASS INDEX: 31.05 KG/M2 | OXYGEN SATURATION: 97 % | WEIGHT: 193.2 LBS | TEMPERATURE: 98.8 F | HEART RATE: 92 BPM | DIASTOLIC BLOOD PRESSURE: 70 MMHG | SYSTOLIC BLOOD PRESSURE: 102 MMHG | HEIGHT: 66 IN

## 2018-01-30 DIAGNOSIS — H92.01 OTALGIA, RIGHT: ICD-10-CM

## 2018-01-30 DIAGNOSIS — J01.90 ACUTE SINUSITIS WITH COEXISTING CONDITION, NEED PROPHYLACTIC TREATMENT: Primary | ICD-10-CM

## 2018-01-30 DIAGNOSIS — H61.23 BILATERAL IMPACTED CERUMEN: ICD-10-CM

## 2018-01-30 PROCEDURE — 99202 OFFICE O/P NEW SF 15 MIN: CPT | Mod: 25 | Performed by: FAMILY MEDICINE

## 2018-01-30 PROCEDURE — 69209 REMOVE IMPACTED EAR WAX UNI: CPT | Mod: 50 | Performed by: FAMILY MEDICINE

## 2018-01-30 RX ORDER — FLUTICASONE PROPIONATE 50 MCG
1-2 SPRAY, SUSPENSION (ML) NASAL DAILY
Qty: 1 BOTTLE | Refills: 11 | Status: SHIPPED | OUTPATIENT
Start: 2018-01-30 | End: 2018-04-03

## 2018-01-30 RX ORDER — CEFDINIR 300 MG/1
300 CAPSULE ORAL 2 TIMES DAILY
Qty: 20 CAPSULE | Refills: 0 | Status: SHIPPED | OUTPATIENT
Start: 2018-01-30 | End: 2018-04-03

## 2018-01-30 NOTE — PROGRESS NOTES
SUBJECTIVE:  Evelyn GERMAINE Miner, a 27 year old female scheduled an appointment to discuss the following issues:     Acute sinusitis with coexisting condition, need prophylactic treatment  Otalgia, right  Bilateral impacted cerumen    Medical, social, surgical, and family histories reviewed.      OBJECTIVE: The patient appears alert and mild distress.   EARS: positive findings: cerumen bilaterally, after irrigation ears appeared clear  NOSE/SINUS: positive findings: mucosa erythematous and swollen  Sinus palpation: Maxillary sinus tender to palpation   THROAT: mild erythema   NECK:positive findings: moderate anterior cervical nodes   CHEST: Clear        ASSESSMENT: Acute Sinusitis  Cerumen impaction irrigated by nurse  Kailash right ear    PLAN: See orders.   In addition, I have suggested that the patient   Push fluids push fluids.

## 2018-01-30 NOTE — NURSING NOTE
"Chief Complaint   Patient presents with     Sinus Problem     x 4 days., sinus pain and pressure,  green mucus, runny nose, ear pain, nausea, fatigue, dizzy, hx of low homoglobin        Initial /70  Pulse 92  Temp 98.8  F (37.1  C) (Oral)  Ht 5' 6\" (1.676 m)  Wt 193 lb 3.2 oz (87.6 kg)  SpO2 97%  BMI 31.18 kg/m2 Estimated body mass index is 31.18 kg/(m^2) as calculated from the following:    Height as of this encounter: 5' 6\" (1.676 m).    Weight as of this encounter: 193 lb 3.2 oz (87.6 kg).  Medication Reconciliation: complete   Rosana Perez MA// January 30, 2018 4:52 PM          "

## 2018-02-02 ENCOUNTER — OFFICE VISIT (OUTPATIENT)
Dept: PEDIATRICS | Facility: CLINIC | Age: 28
End: 2018-02-02
Payer: COMMERCIAL

## 2018-02-02 VITALS
HEART RATE: 82 BPM | TEMPERATURE: 98.3 F | DIASTOLIC BLOOD PRESSURE: 66 MMHG | BODY MASS INDEX: 30.86 KG/M2 | WEIGHT: 192 LBS | OXYGEN SATURATION: 98 % | HEIGHT: 66 IN | SYSTOLIC BLOOD PRESSURE: 106 MMHG

## 2018-02-02 DIAGNOSIS — Z00.00 HEALTH CARE MAINTENANCE: Primary | ICD-10-CM

## 2018-02-02 DIAGNOSIS — Z30.40 ENCOUNTER FOR SURVEILLANCE OF CONTRACEPTIVES, UNSPECIFIED CONTRACEPTIVE: ICD-10-CM

## 2018-02-02 DIAGNOSIS — Z80.41 FAMILY HISTORY OF MALIGNANT NEOPLASM OF OVARY: ICD-10-CM

## 2018-02-02 DIAGNOSIS — N92.0 EXCESSIVE OR FREQUENT MENSTRUATION: ICD-10-CM

## 2018-02-02 LAB
BASOPHILS # BLD AUTO: 0.1 10E9/L (ref 0–0.2)
BASOPHILS NFR BLD AUTO: 0.6 %
DIFFERENTIAL METHOD BLD: NORMAL
EOSINOPHIL # BLD AUTO: 0.2 10E9/L (ref 0–0.7)
EOSINOPHIL NFR BLD AUTO: 2 %
ERYTHROCYTE [DISTWIDTH] IN BLOOD BY AUTOMATED COUNT: 12.7 % (ref 10–15)
HCT VFR BLD AUTO: 40.4 % (ref 35–47)
HGB BLD-MCNC: 13.2 G/DL (ref 11.7–15.7)
LYMPHOCYTES # BLD AUTO: 2.9 10E9/L (ref 0.8–5.3)
LYMPHOCYTES NFR BLD AUTO: 36.1 %
MCH RBC QN AUTO: 30.8 PG (ref 26.5–33)
MCHC RBC AUTO-ENTMCNC: 32.7 G/DL (ref 31.5–36.5)
MCV RBC AUTO: 94 FL (ref 78–100)
MONOCYTES # BLD AUTO: 0.7 10E9/L (ref 0–1.3)
MONOCYTES NFR BLD AUTO: 8.7 %
NEUTROPHILS # BLD AUTO: 4.3 10E9/L (ref 1.6–8.3)
NEUTROPHILS NFR BLD AUTO: 52.6 %
PLATELET # BLD AUTO: 303 10E9/L (ref 150–450)
RBC # BLD AUTO: 4.29 10E12/L (ref 3.8–5.2)
WBC # BLD AUTO: 8.1 10E9/L (ref 4–11)

## 2018-02-02 PROCEDURE — 36415 COLL VENOUS BLD VENIPUNCTURE: CPT | Performed by: NURSE PRACTITIONER

## 2018-02-02 PROCEDURE — 85025 COMPLETE CBC W/AUTO DIFF WBC: CPT | Performed by: NURSE PRACTITIONER

## 2018-02-02 PROCEDURE — 82728 ASSAY OF FERRITIN: CPT | Performed by: NURSE PRACTITIONER

## 2018-02-02 PROCEDURE — G0145 SCR C/V CYTO,THINLAYER,RESCR: HCPCS | Performed by: NURSE PRACTITIONER

## 2018-02-02 PROCEDURE — 99395 PREV VISIT EST AGE 18-39: CPT | Performed by: NURSE PRACTITIONER

## 2018-02-02 PROCEDURE — 84443 ASSAY THYROID STIM HORMONE: CPT | Performed by: NURSE PRACTITIONER

## 2018-02-02 PROCEDURE — 99214 OFFICE O/P EST MOD 30 MIN: CPT | Mod: 25 | Performed by: NURSE PRACTITIONER

## 2018-02-02 RX ORDER — MISOPROSTOL 200 UG/1
200 TABLET ORAL 4 TIMES DAILY
Qty: 1 TABLET | Refills: 0 | Status: SHIPPED | OUTPATIENT
Start: 2018-02-02 | End: 2018-04-03

## 2018-02-02 NOTE — NURSING NOTE
"Chief Complaint   Patient presents with     Physical       Initial /66 (Cuff Size: Adult Regular)  Pulse 82  Temp 98.3  F (36.8  C) (Tympanic)  Ht 5' 6\" (1.676 m)  Wt 192 lb (87.1 kg)  LMP 01/11/2018 (Exact Date)  SpO2 98%  BMI 30.99 kg/m2 Estimated body mass index is 30.99 kg/(m^2) as calculated from the following:    Height as of this encounter: 5' 6\" (1.676 m).    Weight as of this encounter: 192 lb (87.1 kg).  Medication Reconciliation: complete   Ny Durand CMA    "

## 2018-02-02 NOTE — PROGRESS NOTES
SUBJECTIVE:   CC: Evelyn Miner is an 27 year old woman who presents for preventive health visit.     Physical   Annual:     Getting at least 3 servings of Calcium per day::  NO    Bi-annual eye exam::  Yes    Dental care twice a year::  NO    Sleep apnea or symptoms of sleep apnea::  None    Diet::  Regular (no restrictions)    Frequency of exercise::  1 day/week    Duration of exercise::  15-30 minutes    Taking medications regularly::  Yes    Medication side effects::  Not applicable    Additional concerns today::  YES          Concerns today: heavy menses since birth of children, dizziness with menses. They have been slightly heavier than previous. Still regular.     Has had problems with low libido on pills in past      Today's PHQ-2 Score:   PHQ-2 ( 1999 [employer]) 2/2/2018   Q1: Little interest or pleasure in doing things 0   Q2: Feeling down, depressed or hopeless 0   PHQ-2 Score 0   Q1: Little interest or pleasure in doing things Not at all   Q2: Feeling down, depressed or hopeless Not at all   PHQ-2 Score 0     Abuse: Current or Past(Physical, Sexual or Emotional)- No  Do you feel safe in your environment - Yes    Social History   Substance Use Topics     Smoking status: Never Smoker     Smokeless tobacco: Never Used     Alcohol use No      Comment: rare     Alcohol Use 2/2/2018   If you drink alcohol, do you typically have greater than 3 drinks per day OR greater than 7 drinks per week?   No   No flowsheet data found.    Reviewed orders with patient.  Reviewed health maintenance and updated orders accordingly - Yes  Labs reviewed in EPIC    Mammogram not appropriate for this patient based on age.    Pertinent mammograms are reviewed under the imaging tab.  History of abnormal Pap smear: NO - age 21-29 PAP every 3 years recommended    Reviewed and updated as needed this visit by clinical staff  Tobacco  Allergies  Meds  Med Hx  Surg Hx  Fam Hx  Soc Hx        Reviewed and updated as needed this  "visit by Provider            Review of Systems  C: NEGATIVE for fever, chills, change in weight  I: NEGATIVE for worrisome rashes, moles or lesions  E: NEGATIVE for vision changes or irritation  ENT: NEGATIVE for ear, mouth and throat problems  R: NEGATIVE for significant cough or SOB  B: NEGATIVE for masses, tenderness or discharge  CV: NEGATIVE for chest pain, palpitations or peripheral edema  GI: NEGATIVE for nausea, abdominal pain, heartburn, or change in bowel habits   female: as above  M: NEGATIVE for significant arthralgias or myalgia  N: NEGATIVE for weakness, dizziness or paresthesias  P: NEGATIVE for changes in mood or affect     OBJECTIVE:   /66 (Cuff Size: Adult Regular)  Pulse 82  Temp 98.3  F (36.8  C) (Tympanic)  Ht 5' 6\" (1.676 m)  Wt 192 lb (87.1 kg)  LMP 01/11/2018 (Exact Date)  SpO2 98%  BMI 30.99 kg/m2  Physical Exam  GENERAL: healthy, alert and no distress  EYES: Eyes grossly normal to inspection, PERRL and conjunctivae and sclerae normal  HENT: ear canals and TM's normal, nose and mouth without ulcers or lesions  NECK: no adenopathy, no asymmetry, masses, or scars and thyroid normal to palpation  RESP: lungs clear to auscultation - no rales, rhonchi or wheezes  BREAST: normal without masses, tenderness or nipple discharge and no palpable axillary masses or adenopathy  CV: regular rate and rhythm, normal S1 S2, no S3 or S4, no murmur, click or rub, no peripheral edema and peripheral pulses strong  ABDOMEN: soft, nontender, no hepatosplenomegaly, no masses and bowel sounds normal   (female): normal female external genitalia, normal urethral meatus, vaginal mucosa pink, moist, well rugated, and normal cervix/adnexa/uterus without masses or discharge  MS: no gross musculoskeletal defects noted, no edema  SKIN: no suspicious lesions or rashes  NEURO: Normal strength and tone, mentation intact and speech normal  PSYCH: mentation appears normal, affect " "normal/bright    ASSESSMENT/PLAN:   1. Health care maintenance  - PAP imaged thin layer screen reflex to HPV if ASCUS - recommended age 25 - 29 years    2. Excessive or frequent menstruation  HUB after the birth of twins about a year ago. No increase in pain or change in regularity. Not currently on any form of contraception. DDX includes fibroids, polyp, hypothyroidism, etc.Patient's mom with undetermined ovarian/endometrial CA. She is supposed to get yearly US   - CBC with platelets differential  - Ferritin  - TSH with free T4 reflex  - US Pelvic Complete w Transvaginal; Future    3. Encounter for surveillance of contraceptives, unspecified contraceptive  Wishing to get IUD to reduce flow. No contraindications. Understands r/b/se. Cytotec evening before and ibuprofen 30 minutes prior to procedure. She will schedule with [facility] no consult. Will await pelvic US results prior to scheduling.   - misoprostol (CYTOTEC) 200 MCG tablet; Take 1 tablet (200 mcg) by mouth 4 times daily  Dispense: 1 tablet; Refill: 0    4. Family history of malignant neoplasm of ovary  Patient's mom with undetermined ovarian/endometrial CA. She is supposed to get yearly US. Currently having HMB.  - US Pelvic Complete w Transvaginal; Future    COUNSELING:  Reviewed preventive health counseling, as reflected in patient instructions         reports that she has never smoked. She has never used smokeless tobacco.    Estimated body mass index is 30.99 kg/(m^2) as calculated from the following:    Height as of this encounter: 5' 6\" (1.676 m).    Weight as of this encounter: 192 lb (87.1 kg).   Weight management plan: Discussed healthy diet and exercise guidelines and patient will follow up in 12 months in clinic to re-evaluate.    Counseling Resources:  ATP IV Guidelines  Pooled Cohorts Equation Calculator  Breast Cancer Risk Calculator  FRAX Risk Assessment  ICSI Preventive Guidelines  Dietary Guidelines for Americans, 2010  [employer] MyPlate  ASA " Prophylaxis  Lung CA Screening    Milvia Crabtree, PIPO [facility]

## 2018-02-02 NOTE — MR AVS SNAPSHOT
"              After Visit Summary   2/2/2018    Evelyn Miner    MRN: [id number]           Patient Information     Date Of Birth          1990        Visit Information        Provider Department      2/2/2018 3:40 PM Milvia Crabtree APRN [facility]        Today's Diagnoses     Health care maintenance    -  1    Excessive or frequent menstruation        Encounter for surveillance of contraceptives, unspecified contraceptive        Family history of malignant neoplasm of ovary          Care Instructions    1. For IUD. Schedule \"inserion IUD no consult per milvia crabtree\" with Nury Dwyer  2. Take misoprostal 1 tab by mouth night before  3. Take 400-800mg ibuprofen with food as you walk in the door  4. Today I'll check labs   5. Please schedule pelvic ultrasound. [facility] Radiology Scheduling [phone]      Preventive Health Recommendations  Female Ages 26 - 39  Yearly exam:   See your health care provider every year in order to    Review health changes.     Discuss preventive care.      Review your medicines if you your doctor has prescribed any.    Until age 30: Get a Pap test every three years (more often if you have had an abnormal result).    After age 30: Talk to your doctor about whether you should have a Pap test every 3 years or have a Pap test with HPV screening every 5 years.   You do not need a Pap test if your uterus was removed (hysterectomy) and you have not had cancer.  You should be tested each year for STDs (sexually transmitted diseases), if you're at risk.   Talk to your provider about how often to have your cholesterol checked.  If you are at risk for diabetes, you should have a diabetes test (fasting glucose).  Shots: Get a flu shot each year. Get a tetanus shot every 10 years.   Nutrition:     Eat at least 5 servings of fruits and vegetables each day.    Eat whole-grain bread, whole-wheat pasta and brown rice instead of white grains and rice.    Talk to your " provider about Calcium and Vitamin D.     Lifestyle    Exercise at least 150 minutes a week (30 minutes a day, 5 days of the week). This will help you control your weight and prevent disease.    Limit alcohol to one drink per day.    No smoking.     Wear sunscreen to prevent skin cancer.    See your dentist every six months for an exam and cleaning.            Follow-ups after your visit        Future tests that were ordered for you today     Open Future Orders        Priority Expected Expires Ordered    US Pelvic Complete w Transvaginal Routine  2/2/2019 2/2/2018            Who to contact     If you have questions or need follow up information about today's clinic visit or your schedule please contact [facility] directly at [phone].  Normal or non-critical lab and imaging results will be communicated to you by [employer], letter or phone within 4 business days after the clinic has received the results. If you do not hear from us within 7 days, please contact the clinic through [employer] or phone. If you have a critical or abnormal lab result, we will notify you by phone as soon as possible.  Submit refill requests through [employer] or call your pharmacy and they will forward the refill request to us. Please allow 3 business days for your refill to be completed.          Additional Information About Your Visit        [employer] Information     [employer] gives you secure access to your electronic health record. If you see a primary care provider, you can also send messages to your care team and make appointments. If you have questions, please call your primary care clinic.  If you do not have a primary care provider, please call [phone] and they will assist you.        Care EveryWhere ID     This is your Care EveryWhere ID. This could be used by other organizations to access your [place] medical records  [id number]        Your Vitals Were     Pulse Temperature Height Last Period Pulse Oximetry BMI (Body  "Mass Index)    82 98.3  F (36.8  C) (Tympanic) 5' 6\" (1.676 m) 01/11/2018 (Exact Date) 98% 30.99 kg/m2       Blood Pressure from Last 3 Encounters:   02/02/18 106/66   01/30/18 102/70   03/07/17 105/69    Weight from Last 3 Encounters:   02/02/18 192 lb (87.1 kg)   01/30/18 193 lb 3.2 oz (87.6 kg)   02/28/17 233 lb 12.8 oz (106.1 kg)              We Performed the Following     CBC with platelets differential     Ferritin     PAP imaged thin layer screen reflex to HPV if ASCUS - recommended age 25 - 29 years     TSH with free T4 reflex          Today's Medication Changes          These changes are accurate as of 2/2/18  4:27 PM.  If you have any questions, ask your nurse or doctor.               Start taking these medicines.        Dose/Directions    misoprostol 200 MCG tablet   Commonly known as:  CYTOTEC   Used for:  Encounter for surveillance of contraceptives, unspecified contraceptive   Started by:  Milvia Crabtree APRN CNP        Dose:  200 mcg   Take 1 tablet (200 mcg) by mouth 4 times daily   Quantity:  1 tablet   Refills:  0            Where to get your medicines      These medications were sent to [facility] - [address]   [address], [place] [address]     Phone:  [phone]     misoprostol 200 MCG tablet                Primary Care Provider Office Phone # Fax #    Nicolette [facility] [phone]       [facility] [address] [place] [address]        Equal Access to Services     [facility] AH: Hadii aad ku hadasho Soomaali, waaxda luqadaha, qaybta kaalmada adeegyajenni, jaimie ames . So [facility] [phone].    ATENCIÓN: Si habla español, tiene a croral disposición servicios gratuitos de asistencia lingüística. Llame al [phone].    We comply with applicable federal civil rights laws and [place] laws. We do not discriminate on the basis of race, color, national origin, " age, disability, sex, sexual orientation, or gender identity.            Thank you!     Thank you for choosing [facility]  for your care. Our goal is always to provide you with excellent care. Hearing back from our patients is one way we can continue to improve our services. Please take a few minutes to complete the written survey that you may receive in the mail after your visit with us. Thank you!             Your Updated Medication List - Protect others around you: Learn how to safely use, store and throw away your medicines at www.disposemymeds.org.          This list is accurate as of 2/2/18  4:27 PM.  Always use your most recent med list.                   Brand Name Dispense Instructions for use Diagnosis    cefdinir 300 MG capsule    OMNICEF    20 capsule    Take 1 capsule (300 mg) by mouth 2 times daily    Acute sinusitis with coexisting condition, need prophylactic treatment, Otalgia, right       ferrous sulfate 142 (45 FE) MG Tbcr    SLO-FE    60 tablet    Take 1 tablet (142 mg) by mouth daily    Anemia due to blood loss, acute       fluticasone 50 MCG/ACT spray    FLONASE    1 Bottle    Spray 1-2 sprays into both nostrils daily    Otalgia, right, Bilateral impacted cerumen, Acute sinusitis with coexisting condition, need prophylactic treatment       misoprostol 200 MCG tablet    CYTOTEC    1 tablet    Take 1 tablet (200 mcg) by mouth 4 times daily    Encounter for surveillance of contraceptives, unspecified contraceptive

## 2018-02-02 NOTE — PATIENT INSTRUCTIONS
"1. For IUD. Schedule \"inserion IUD no consult per katia juli\" with Nury Michaela Dwyer  2. Take misoprostal 1 tab by mouth night before  3. Take 400-800mg ibuprofen with food as you walk in the door  4. Today I'll check labs   5. Please schedule pelvic ultrasound. [facility] Scheduling [phone]      Preventive Health Recommendations  Female Ages 26 - 39  Yearly exam:   See your health care provider every year in order to    Review health changes.     Discuss preventive care.      Review your medicines if you your doctor has prescribed any.    Until age 30: Get a Pap test every three years (more often if you have had an abnormal result).    After age 30: Talk to your doctor about whether you should have a Pap test every 3 years or have a Pap test with HPV screening every 5 years.   You do not need a Pap test if your uterus was removed (hysterectomy) and you have not had cancer.  You should be tested each year for STDs (sexually transmitted diseases), if you're at risk.   Talk to your provider about how often to have your cholesterol checked.  If you are at risk for diabetes, you should have a diabetes test (fasting glucose).  Shots: Get a flu shot each year. Get a tetanus shot every 10 years.   Nutrition:     Eat at least 5 servings of fruits and vegetables each day.    Eat whole-grain bread, whole-wheat pasta and brown rice instead of white grains and rice.    Talk to your provider about Calcium and Vitamin D.     Lifestyle    Exercise at least 150 minutes a week (30 minutes a day, 5 days of the week). This will help you control your weight and prevent disease.    Limit alcohol to one drink per day.    No smoking.     Wear sunscreen to prevent skin cancer.    See your dentist every six months for an exam and cleaning.    "

## 2018-02-04 LAB
FERRITIN SERPL-MCNC: 30 NG/ML (ref 12–150)
TSH SERPL DL<=0.005 MIU/L-ACNC: 0.9 MU/L (ref 0.4–4)

## 2018-02-05 ENCOUNTER — RADIANT APPOINTMENT (OUTPATIENT)
Dept: ULTRASOUND IMAGING | Facility: CLINIC | Age: 28
End: 2018-02-05
Attending: NURSE PRACTITIONER
Payer: COMMERCIAL

## 2018-02-05 DIAGNOSIS — Z80.41 FAMILY HISTORY OF MALIGNANT NEOPLASM OF OVARY: ICD-10-CM

## 2018-02-05 DIAGNOSIS — N92.0 EXCESSIVE OR FREQUENT MENSTRUATION: ICD-10-CM

## 2018-02-05 PROCEDURE — 76830 TRANSVAGINAL US NON-OB: CPT | Performed by: OBSTETRICS & GYNECOLOGY

## 2018-02-05 PROCEDURE — 76856 US EXAM PELVIC COMPLETE: CPT | Performed by: OBSTETRICS & GYNECOLOGY

## 2018-02-06 LAB
COPATH REPORT: NORMAL
PAP: NORMAL

## 2018-04-03 ENCOUNTER — OFFICE VISIT (OUTPATIENT)
Dept: PEDIATRICS | Facility: CLINIC | Age: 28
End: 2018-04-03
Payer: COMMERCIAL

## 2018-04-03 VITALS
TEMPERATURE: 98.7 F | DIASTOLIC BLOOD PRESSURE: 58 MMHG | HEART RATE: 99 BPM | BODY MASS INDEX: 31.18 KG/M2 | SYSTOLIC BLOOD PRESSURE: 116 MMHG | HEIGHT: 66 IN | WEIGHT: 194 LBS | OXYGEN SATURATION: 98 %

## 2018-04-03 DIAGNOSIS — N63.0 LUMP OR MASS IN BREAST: Primary | ICD-10-CM

## 2018-04-03 PROCEDURE — 99213 OFFICE O/P EST LOW 20 MIN: CPT | Performed by: INTERNAL MEDICINE

## 2018-04-03 NOTE — PROGRESS NOTES
SUBJECTIVE:   Evelyn Miner is a 27 year old female who presents to clinic today for the following health issues:    Evelyn presents to the clinic for the complaint of right sided breast lump. Patient noticed lump 3 weeks ago. Notes in the past few days she states the lump appears to be getting bigger. Patient denies FHx of breast cancer, but notes a FHx of ovarian cancer.      Breast mass      Duration: 1-2 weeks    Description (location/character/radiation): right side, 9 o'clock    Intensity:  mild    Accompanying signs and symptoms: denies pain or discharge    History (similar episodes/previous evaluation): breast tissue has always been fibrous, pt is doing breast exams every couple weeks    Precipitating or alleviating factors: None    Therapies tried and outcome: None           Problem list and histories reviewed & adjusted, as indicated.  Additional history: as documented    Patient Active Problem List   Diagnosis     Pain in joint, lower leg     Other acne     Ureterolithiasis     CARDIOVASCULAR SCREENING; LDL GOAL LESS THAN 160     Anxiety     Contraception management     Encounter for triage in pregnant patient     Pregnancy     Indication for care in labor or delivery     S/P  section     Family history of malignant neoplasm of ovary     Past Surgical History:   Procedure Laterality Date     APPENDECTOMY       C NONSPECIFIC PROCEDURE      Left knee exam under anesthesia and long-      SECTION N/A 3/3/2017    Procedure:  SECTION;  Surgeon: Humza Bustamante MD;  Location:  L+D     ENT SURGERY       HEAD & NECK SURGERY      [place] teeth extracted       Social History   Substance Use Topics     Smoking status: Never Smoker     Smokeless tobacco: Never Used     Alcohol use No      Comment: rare     Family History   Problem Relation Age of Onset     C.A.D. Maternal Grandmother      Hypertension Maternal Grandmother      Blood Disease Maternal Grandfather      leukemia  "     Other Cancer Mother      ovarian - approx age 52     Thyroid Disease Mother      hyperthyroid     Hypertension Father      Hyperlipidemia Father      Anxiety Disorder Father      Alcoholism Father          No current outpatient prescriptions on file.     No Known Allergies    Reviewed and updated as needed this visit by clinical staff       Reviewed and updated as needed this visit by Provider         ROS:  Constitutional, HEENT, cardiovascular, pulmonary, gi and gu systems are negative, except as otherwise noted.    This document serves as a record of the services and decisions personally performed and made by Haylee Moya MD. It was created on her behalf by Brittany Thurston, a trained medical scribe. The creation of this document is based the provider's statements to the medical scribe.    Brittany Thurston April 3, 2018 1:51 PM  OBJECTIVE:     /58 (BP Location: Right arm, Cuff Size: Adult Regular)  Pulse 99  Temp 98.7  F (37.1  C) (Oral)  Ht 1.676 m (5' 6\")  Wt 88 kg (194 lb)  LMP 2018 (Exact Date)  SpO2 98%  BMI 31.31 kg/m2  Body mass index is 31.31 kg/(m^2).  GENERAL: healthy, alert and no distress  RESP: lungs clear to auscultation - no rales, rhonchi or wheezes  BREAST: normal without masses, tenderness or nipple discharge and no palpable axillary masses or adenopathy.  Area of concern is in right breast, about 9 oclock position.  There is no discrete mass there.  Fibrous tissue palpated.    CV: regular rate and rhythm, normal S1 S2, no S3 or S4, no murmur, click or rub    Diagnostic Test Results:  none     ASSESSMENT/PLAN:     (N63.0) Lump or mass in breast  (primary encounter diagnosis)  -no discrete mass palpated, feels most like normal dense breast tissue  -will send for mammogram and ultrasound as patient feels this area is changing.    Plan:  MA Diagnostic Digital Bilateral           The information in this document, created by the medical scribe for me, accurately " reflects the services I personally performed and the decisions made by me. I have reviewed and approved this document for accuracy prior to leaving the patient care area.  Haylee Moya MD  [facility]

## 2018-04-03 NOTE — MR AVS SNAPSHOT
After Visit Summary   4/3/2018    Evelyn Miner    MRN: [id number]           Patient Information     Date Of Birth          1990        Visit Information        Provider Department      4/3/2018 1:40 PM [facility]lee [facility] MD [facility]        Today's Diagnoses     Lump or mass in breast    -  1       Follow-ups after your visit        Future tests that were ordered for you today     Open Future Orders        Priority Expected Expires Ordered    MA Diagnostic Digital Bilateral Routine  4/3/2019 4/3/2018            Who to contact     If you have questions or need follow up information about today's clinic visit or your schedule please contact [facility] directly at [phone].  Normal or non-critical lab and imaging results will be communicated to you by MyChart, letter or phone within 4 business days after the clinic has received the results. If you do not hear from us within 7 days, please contact the clinic through Loosecubeshart or phone. If you have a critical or abnormal lab result, we will notify you by phone as soon as possible.  Submit refill requests through Acquia or call your pharmacy and they will forward the refill request to us. Please allow 3 business days for your refill to be completed.          Additional Information About Your Visit        MyChart Information     Acquia gives you secure access to your electronic health record. If you see a primary care provider, you can also send messages to your care team and make appointments. If you have questions, please call your primary care clinic.  If you do not have a primary care provider, please call [phone] and they will assist you.        Care EveryWhere ID     This is your Care EveryWhere ID. This could be used by other organizations to access your [place] medical records  [id number]        Your Vitals Were     Pulse Temperature Height Last Period Pulse Oximetry BMI (Body Mass Index)    99 98.7  " F (37.1  C) (Oral) 5' 6\" (1.676 m) 03/31/2018 (Exact Date) 98% 31.31 kg/m2       Blood Pressure from Last 3 Encounters:   04/03/18 116/58   02/02/18 106/66   01/30/18 102/70    Weight from Last 3 Encounters:   04/03/18 194 lb (88 kg)   02/02/18 192 lb (87.1 kg)   01/30/18 193 lb 3.2 oz (87.6 kg)               Primary Care Provider Office Phone # Fax #    Nicolette Richard [facility] [phone]       [facility] [address] [place] [address]        Equal Access to Services     [facility] VICKIE : Hadii genesis galvez hadasho Sor[place] annali, waaxda luqadaha, qaybta kaalmada adeegyada, waxay orenin hayaleah ames . So [facility] [phone].    ATENCIÓN: Si habla español, tiene a corral disposición servicios gratuitos de asistencia lingüística. Llame al [phone].    We comply with applicable federal civil rights laws and [place] laws. We do not discriminate on the basis of race, color, national origin, age, disability, sex, sexual orientation, or gender identity.            Thank you!     Thank you for choosing [facility]  for your care. Our goal is always to provide you with excellent care. Hearing back from our patients is one way we can continue to improve our services. Please take a few minutes to complete the written survey that you may receive in the mail after your visit with us. Thank you!             Your Updated Medication List - Protect others around you: Learn how to safely use, store and throw away your medicines at www.disposemymeds.org.      Notice  As of 4/3/2018  2:07 PM    You have not been prescribed any medications.      "

## 2018-04-04 ENCOUNTER — HOSPITAL ENCOUNTER (OUTPATIENT)
Dept: MAMMOGRAPHY | Facility: CLINIC | Age: 28
Discharge: HOME | End: 2018-04-04
Attending: INTERNAL MEDICINE | Admitting: INTERNAL MEDICINE
Payer: COMMERCIAL

## 2018-04-04 ENCOUNTER — HOSPITAL ENCOUNTER (OUTPATIENT)
Dept: MAMMOGRAPHY | Facility: CLINIC | Age: 28
End: 2018-04-04
Attending: INTERNAL MEDICINE
Payer: COMMERCIAL

## 2018-04-04 DIAGNOSIS — N63.0 LUMP OR MASS IN BREAST: ICD-10-CM

## 2018-04-04 PROCEDURE — G0279 TOMOSYNTHESIS, MAMMO: HCPCS

## 2018-04-04 PROCEDURE — 76642 ULTRASOUND BREAST LIMITED: CPT | Mod: RT

## 2018-06-21 ENCOUNTER — OFFICE VISIT (OUTPATIENT)
Dept: PEDIATRICS | Facility: CLINIC | Age: 28
End: 2018-06-21
Payer: COMMERCIAL

## 2018-06-21 VITALS
SYSTOLIC BLOOD PRESSURE: 102 MMHG | HEART RATE: 69 BPM | DIASTOLIC BLOOD PRESSURE: 66 MMHG | WEIGHT: 195 LBS | HEIGHT: 66 IN | BODY MASS INDEX: 31.34 KG/M2 | OXYGEN SATURATION: 98 % | TEMPERATURE: 97.9 F

## 2018-06-21 DIAGNOSIS — Z30.9 ENCOUNTER FOR CONTRACEPTIVE MANAGEMENT, UNSPECIFIED TYPE: Primary | ICD-10-CM

## 2018-06-21 PROCEDURE — 99212 OFFICE O/P EST SF 10 MIN: CPT | Performed by: NURSE PRACTITIONER

## 2018-06-21 RX ORDER — NORELGESTROMIN AND ETHINYL ESTRADIOL 35; 150 UG/MG; UG/MG
PATCH TRANSDERMAL
Qty: 9 PATCH | Refills: 3 | Status: CANCELLED | OUTPATIENT
Start: 2018-06-21

## 2018-06-21 NOTE — MR AVS SNAPSHOT
After Visit Summary   6/21/2018    Evelyn Miner    MRN: [id number]           Patient Information     Date Of Birth          1990        Visit Information        Provider Department      6/21/2018 12:20 PM Milvia Crabtree APRN CNP [facility]        Care Instructions    Please see Nury for IUD    Take misoprostol by mouth night before IUD    Take 600mg ibuprofen          Follow-ups after your visit        Your next 10 appointments already scheduled     Jun 21, 2018 12:20 PM CDT   SHORT with PIPO Kilgore CNP   [facility]    [address]  [address]  [facility] [id number] [phone]              Who to contact     If you have questions or need follow up information about today's clinic visit or your schedule please contact [facility] directly at [phone].  Normal or non-critical lab and imaging results will be communicated to you by MyChart, letter or phone within 4 business days after the clinic has received the results. If you do not hear from us within 7 days, please contact the clinic through CodeSealerhart or phone. If you have a critical or abnormal lab result, we will notify you by phone as soon as possible.  Submit refill requests through mVisum or call your pharmacy and they will forward the refill request to us. Please allow 3 business days for your refill to be completed.          Additional Information About Your Visit        MyChart Information     mVisum gives you secure access to your electronic health record. If you see a primary care provider, you can also send messages to your care team and make appointments. If you have questions, please call your primary care clinic.  If you do not have a primary care provider, please call [phone] and they will assist you.        Care EveryWhere ID     This is your Care EveryWhere ID. This could be used by other organizations to access  "your [place] medical records  [id number]        Your Vitals Were     Pulse Temperature Height Last Period Pulse Oximetry BMI (Body Mass Index)    69 97.9  F (36.6  C) (Tympanic) 5' 6\" (1.676 m) 06/17/2018 (Exact Date) 98% 31.47 kg/m2       Blood Pressure from Last 3 Encounters:   06/21/18 102/66   04/03/18 116/58   02/02/18 106/66    Weight from Last 3 Encounters:   06/21/18 195 lb (88.5 kg)   04/03/18 194 lb (88 kg)   02/02/18 192 lb (87.1 kg)              Today, you had the following     No orders found for display       Primary Care Provider Office Phone # Fax #    Nicolette Richard [facility] [phone]       [facility] [address] [place] [address]        Equal Access to Services     [facility]: Hadii aad ku hadasho Soomaali, waaxda luqadaha, qaybta kaalmada adeegyada, waxay idiin hayaan alvaro ames . So [facility] [phone].    ATENCIÓN: Si habla español, tiene a corral disposición servicios gratuitos de asistencia lingüística. Llame al [phone].    We comply with applicable federal civil rights laws and [place] laws. We do not discriminate on the basis of race, color, national origin, age, disability, sex, sexual orientation, or gender identity.            Thank you!     Thank you for choosing [facility]  for your care. Our goal is always to provide you with excellent care. Hearing back from our patients is one way we can continue to improve our services. Please take a few minutes to complete the written survey that you may receive in the mail after your visit with us. Thank you!             Your Updated Medication List - Protect others around you: Learn how to safely use, store and throw away your medicines at www.disposemymeds.org.      Notice  As of 6/21/2018 12:16 PM    You have not been prescribed any medications.      "

## 2018-06-21 NOTE — PROGRESS NOTES
"  SUBJECTIVE:   Evelyn Miner is a 28 year old female who presents to clinic today for the following health issues:      contraception    ROS: const/neuro/gyn otherwise negative     OBJECTIVE:  /66 (BP Location: Right arm, Patient Position: Sitting, Cuff Size: Adult Regular)  Pulse 69  Temp 97.9  F (36.6  C) (Tympanic)  Ht 5' 6\" (1.676 m)  Wt 195 lb (88.5 kg)  LMP 06/17/2018 (Exact Date)  SpO2 98%  BMI 31.47 kg/m2  CONSTITUTIONAL: Alert, well-nourished, well-groomed, NAD  RESP: Lungs CTA. No wheeze, rhonchi, rales.  CV: HRRR S1 S2 No MRG. No peripheral edema      ASSESSMENT/PLAN:  (Z30.9) Encounter for contraceptive management, unspecified type  (primary encounter diagnosis)  Comment: Gets migraine with aura. Discussed that this was an absolute contraindication for estrogen containing birth control methods. Discussed possibilities of IUD vs nexplanon vs POP. Discussed that IUD would not control regularity, but would likely reduce flow or stop mensuration all together. Pt has had IUD in the past and it was uncomfortable. However, she wishes to try again.   Plan:   -Discussed r/b/se of IUD and placement technique.   Patient Instructions   Please see Nury for IUD    Take misoprostol by mouth night before IUD    Take 600mg ibuprofen        VENESSA Wilson-LARRY.        "

## 2018-09-12 ENCOUNTER — RADIANT APPOINTMENT (OUTPATIENT)
Dept: ULTRASOUND IMAGING | Facility: CLINIC | Age: 28
End: 2018-09-12
Attending: NURSE PRACTITIONER
Payer: COMMERCIAL

## 2018-09-12 DIAGNOSIS — N92.0 EXCESSIVE OR FREQUENT MENSTRUATION: ICD-10-CM

## 2018-09-12 DIAGNOSIS — Z80.41 FAMILY HISTORY OF MALIGNANT NEOPLASM OF OVARY: ICD-10-CM

## 2018-09-12 PROCEDURE — 76856 US EXAM PELVIC COMPLETE: CPT | Performed by: OBSTETRICS & GYNECOLOGY

## 2018-09-12 PROCEDURE — 76830 TRANSVAGINAL US NON-OB: CPT | Performed by: OBSTETRICS & GYNECOLOGY

## 2019-03-28 ENCOUNTER — OFFICE VISIT (OUTPATIENT)
Dept: OBGYN | Facility: CLINIC | Age: 29
End: 2019-03-28
Payer: COMMERCIAL

## 2019-03-28 VITALS
DIASTOLIC BLOOD PRESSURE: 62 MMHG | SYSTOLIC BLOOD PRESSURE: 102 MMHG | BODY MASS INDEX: 29.41 KG/M2 | HEIGHT: 66 IN | WEIGHT: 183 LBS

## 2019-03-28 DIAGNOSIS — N92.6 IRREGULAR PERIODS: Primary | ICD-10-CM

## 2019-03-28 DIAGNOSIS — Z30.011 ENCOUNTER FOR INITIAL PRESCRIPTION OF CONTRACEPTIVE PILLS: ICD-10-CM

## 2019-03-28 DIAGNOSIS — Z80.41 FAMILY HISTORY OF MALIGNANT NEOPLASM OF OVARY: ICD-10-CM

## 2019-03-28 DIAGNOSIS — R10.2 PELVIC PAIN IN FEMALE: ICD-10-CM

## 2019-03-28 LAB — HCG UR QL: NEGATIVE

## 2019-03-28 PROCEDURE — 81025 URINE PREGNANCY TEST: CPT | Performed by: ADVANCED PRACTICE MIDWIFE

## 2019-03-28 PROCEDURE — 99203 OFFICE O/P NEW LOW 30 MIN: CPT | Performed by: ADVANCED PRACTICE MIDWIFE

## 2019-03-28 RX ORDER — ACETAMINOPHEN AND CODEINE PHOSPHATE 120; 12 MG/5ML; MG/5ML
0.35 SOLUTION ORAL DAILY
Qty: 84 TABLET | Refills: 3 | Status: SHIPPED | OUTPATIENT
Start: 2019-03-28 | End: 2019-10-08

## 2019-03-28 ASSESSMENT — MIFFLIN-ST. JEOR: SCORE: 1576.83

## 2019-03-28 NOTE — NURSING NOTE
"Chief Complaint   Patient presents with     Abnormal Bleeding Problem     LMP: 3/22/2019, 3/8/2019       Initial /62 (BP Location: Right arm, Patient Position: Sitting, Cuff Size: Adult Regular)   Ht 1.676 m (5' 6\")   Wt 83 kg (183 lb)   LMP 2019 (Exact Date)   BMI 29.54 kg/m   Estimated body mass index is 29.54 kg/m  as calculated from the following:    Height as of this encounter: 1.676 m (5' 6\").    Weight as of this encounter: 83 kg (183 lb).  BP completed using cuff size: regular    Questioned patient about current smoking habits.  Pt. has never smoked.          The following HM Due: NONE    Sebastian Durand CMA                "

## 2019-10-08 ENCOUNTER — OFFICE VISIT (OUTPATIENT)
Dept: FAMILY MEDICINE | Facility: CLINIC | Age: 29
End: 2019-10-08
Payer: COMMERCIAL

## 2019-10-08 VITALS
DIASTOLIC BLOOD PRESSURE: 66 MMHG | WEIGHT: 180 LBS | HEIGHT: 66 IN | TEMPERATURE: 98 F | SYSTOLIC BLOOD PRESSURE: 104 MMHG | OXYGEN SATURATION: 96 % | BODY MASS INDEX: 28.93 KG/M2 | HEART RATE: 89 BPM

## 2019-10-08 DIAGNOSIS — Z00.00 ROUTINE GENERAL MEDICAL EXAMINATION AT A HEALTH CARE FACILITY: Primary | ICD-10-CM

## 2019-10-08 DIAGNOSIS — N92.6 IRREGULAR MENSTRUAL CYCLE: ICD-10-CM

## 2019-10-08 LAB
ERYTHROCYTE [DISTWIDTH] IN BLOOD BY AUTOMATED COUNT: 12.2 % (ref 10–15)
HCT VFR BLD AUTO: 42.2 % (ref 35–47)
HGB BLD-MCNC: 14.4 G/DL (ref 11.7–15.7)
MCH RBC QN AUTO: 31.4 PG (ref 26.5–33)
MCHC RBC AUTO-ENTMCNC: 34.1 G/DL (ref 31.5–36.5)
MCV RBC AUTO: 92 FL (ref 78–100)
PLATELET # BLD AUTO: 282 10E9/L (ref 150–450)
RBC # BLD AUTO: 4.59 10E12/L (ref 3.8–5.2)
WBC # BLD AUTO: 6.1 10E9/L (ref 4–11)

## 2019-10-08 PROCEDURE — 85027 COMPLETE CBC AUTOMATED: CPT | Performed by: NURSE PRACTITIONER

## 2019-10-08 PROCEDURE — 99395 PREV VISIT EST AGE 18-39: CPT | Performed by: NURSE PRACTITIONER

## 2019-10-08 PROCEDURE — 99214 OFFICE O/P EST MOD 30 MIN: CPT | Mod: 25 | Performed by: NURSE PRACTITIONER

## 2019-10-08 PROCEDURE — 80061 LIPID PANEL: CPT | Performed by: NURSE PRACTITIONER

## 2019-10-08 PROCEDURE — 36415 COLL VENOUS BLD VENIPUNCTURE: CPT | Performed by: NURSE PRACTITIONER

## 2019-10-08 PROCEDURE — 82947 ASSAY GLUCOSE BLOOD QUANT: CPT | Performed by: NURSE PRACTITIONER

## 2019-10-08 ASSESSMENT — MIFFLIN-ST. JEOR: SCORE: 1558.22

## 2019-10-08 NOTE — RESULT ENCOUNTER NOTE
Dear Evelyn,     -Normal red blood cell (hgb) levels, normal white blood cell count and normal platelet levels.      Please send a CamSemi message or call [phone]  if you have any questions.      PIPO Valadez, [facility]    If you have further questions about the interpretation of your labs, labtestsonline.org is a good website to check out for further information.

## 2019-10-08 NOTE — PROGRESS NOTES
SUBJECTIVE:   CC: Evelyn Miner is an 29 year old woman who presents for preventive health visit.     Healthy Habits:    Do you get at least three servings of calcium containing foods daily (dairy, green leafy vegetables, etc.)? yes    Amount of exercise or daily activities, outside of work: 3-5 day(s) per week    Problems taking medications regularly not applicable    Medication side effects: No    Have you had an eye exam in the past two years? yes    Do you see a dentist twice per year? At least once per year    Do you have sleep apnea, excessive snoring or daytime drowsiness?yes, daytime drowsiness - sometimes works doubles at work     Headaches:    Has been heaving HA's recently- intermittent- might be related to facts that she needs glasses.  History of migraines with aura.  Needs an eye exam.  Eyes feel strained.  No recent migraines.      Irregular menses:    Menstrual periods have been off- haven't been regular- in the past have been about 5 days and this last one was 8-9 days, last 6 months.   Overall irregular menses within the past 6 months.      She has a history of ovarian cysts & fibroids and her mother had ovarian cancer so she gets concerned when she has any changes in menstrual history.    LMP:  19 - came 1 week early.    Is not currently breastfeeding.    Mother with stage V ovarian cancer.    Mother negative for BRCA1 & 2.   Mirena IUD 6 weeks     .         Today's PHQ-2 Score:   PHQ-2 (  [employer]) 10/8/2019 2018   Q1: Little interest or pleasure in doing things 0 0   Q2: Feeling down, depressed or hopeless 0 0   PHQ-2 Score 0 0   Q1: Little interest or pleasure in doing things - Not at all   Q2: Feeling down, depressed or hopeless - Not at all   PHQ-2 Score - 0       Abuse: Current or Past(Physical, Sexual or Emotional)- No  Do you feel safe in your environment? Yes    Social History     Tobacco Use     Smoking status: Never Smoker     Smokeless tobacco: Never Used    Substance Use Topics     Alcohol use: No     Comment: rare     If you drink alcohol do you typically have >3 drinks per day or >7 drinks per week? No                     Reviewed orders with patient.  Reviewed health maintenance and updated orders accordingly - Yes  BP Readings from Last 3 Encounters:   10/08/19 104/66   19 102/62   18 102/66    Wt Readings from Last 3 Encounters:   10/08/19 81.6 kg (180 lb)   19 83 kg (183 lb)   18 88.5 kg (195 lb)                  Patient Active Problem List   Diagnosis     Pain in joint, lower leg     Other acne     Ureterolithiasis     CARDIOVASCULAR SCREENING; LDL GOAL LESS THAN 160     Anxiety     Encounter for triage in pregnant patient     S/P  section     Family history of malignant neoplasm of ovary     Past Surgical History:   Procedure Laterality Date     APPENDECTOMY       C NONSPECIFIC PROCEDURE      Left knee exam under anesthesia and long-      SECTION N/A 3/3/2017    Procedure:  SECTION;  Surgeon: Humza Bustamante MD;  Location:  L+D     ENT SURGERY       HEAD & NECK SURGERY      [place] teeth extracted       Social History     Tobacco Use     Smoking status: Never Smoker     Smokeless tobacco: Never Used   Substance Use Topics     Alcohol use: No     Comment: rare     Family History   Problem Relation Age of Onset     C.A.D. Maternal Grandmother      Hypertension Maternal Grandmother      Blood Disease Maternal Grandfather         leukemia      Other Cancer Mother         ovarian - approx age 52     Thyroid Disease Mother         hyperthyroid     Hypertension Father      Hyperlipidemia Father      Anxiety Disorder Father      Alcoholism Father          No current outpatient medications on file.       Mammogram not appropriate for this patient based on age.    Pertinent mammograms are reviewed under the imaging tab.  History of abnormal Pap smear: NO - age 21-29 PAP every 3 years recommended  PAP /  "HPV 2018 10/2/2012 2011   PAP NIL NIL NIL     Reviewed and updated as needed this visit by clinical staff  Tobacco  Allergies  Meds         Reviewed and updated as needed this visit by Provider        Past Medical History:   Diagnosis Date     Gastroesophageal reflux disease      Headache(784.0)      JOINT PAIN-LOWER LEG 10/4/2006     Kidney stones      Migraine with aura      Other convulsions     as infant x1     Pregnancy     twins, PATRICK 3/2017     Thyroid disease     Enlarged thyroid     Unspecified disorder of ankle and foot joint     ankle fracture lt      Past Surgical History:   Procedure Laterality Date     APPENDECTOMY       C NONSPECIFIC PROCEDURE      Left knee exam under anesthesia and long-      SECTION N/A 3/3/2017    Procedure:  SECTION;  Surgeon: Humza Bustamante MD;  Location:  L+D     ENT SURGERY       HEAD & NECK SURGERY      [place] teeth extracted       ROS:  CONSTITUTIONAL: NEGATIVE for fever, chills, change in weight  INTEGUMENTARU/SKIN: NEGATIVE for worrisome rashes, moles or lesions  EYES: NEGATIVE for vision changes or irritation  ENT: NEGATIVE for ear, mouth and throat problems  RESP: NEGATIVE for significant cough or SOB  BREAST: NEGATIVE for masses, tenderness or discharge  CV: NEGATIVE for chest pain, palpitations or peripheral edema  GI: NEGATIVE for nausea, abdominal pain, heartburn, or change in bowel habits  : NEGATIVE for unusual urinary or vaginal symptoms. Periods are regular.  MUSCULOSKELETAL: NEGATIVE for significant arthralgias or myalgia  NEURO: NEGATIVE for weakness, dizziness or paresthesias  PSYCHIATRIC: NEGATIVE for changes in mood or affect    OBJECTIVE:   /66 (BP Location: Right arm, Patient Position: Sitting, Cuff Size: Adult Regular)   Pulse 89   Temp 98  F (36.7  C) (Oral)   Ht 1.676 m (5' 6\")   Wt 81.6 kg (180 lb)   LMP 2019   SpO2 96%   BMI 29.05 kg/m    EXAM:  GENERAL: healthy, alert and no " "distress  EYES: Eyes grossly normal to inspection, PERRL and conjunctivae and sclerae normal  HENT: ear canals and TM's normal, nose and mouth without ulcers or lesions  NECK: no adenopathy, no asymmetry, masses, or scars and thyroid normal to palpation  RESP: lungs clear to auscultation - no rales, rhonchi or wheezes  CV: regular rate and rhythm, normal S1 S2, no S3 or S4, no murmur  ABDOMEN: soft, nontender, no hepatosplenomegaly, no masses and bowel sounds normal  MS: no gross musculoskeletal defects noted, no edema  SKIN: no suspicious lesions or rashes  NEURO: Normal strength and tone, mentation intact and speech normal  PSYCH: mentation appears normal, affect normal/bright    ASSESSMENT/PLAN:     Evelyn was seen today for physical.    Diagnoses and all orders for this visit:    Routine general medical examination at a health care facility  -     Lipid panel reflex to direct LDL Fasting  -     CBC with platelets  -     Glucose    Irregular menstrual cycle  Discussed various causes for irregular menses.  Will obtain pelvic ultrasound and continue to closely monitor.   Patient's  planning vasectomy for family planning and patient is not currently requiring contraception. Discussed Nexplanon versus progesterone only contraceptives.    -     US Pelvic Complete w Transvaginal; Future        COUNSELING:   Reviewed preventive health counseling, as reflected in patient instructions       Regular exercise       Healthy diet/nutrition    Estimated body mass index is 29.05 kg/m  as calculated from the following:    Height as of this encounter: 1.676 m (5' 6\").    Weight as of this encounter: 81.6 kg (180 lb).    Weight management plan: Discussed healthy diet and exercise guidelines     reports that she has never smoked. She has never used smokeless tobacco.      Counseling Resources:  ATP IV Guidelines  Pooled Cohorts Equation Calculator  Breast Cancer Risk Calculator  FRAX Risk Assessment  ICSI Preventive " Guidelines  Dietary Guidelines for Americans, 2010  USDA's MyPlate  ASA Prophylaxis  Lung CA Screening    Carmen Duval, PIPO ADAME  [facility]

## 2019-10-09 LAB
CHOLEST SERPL-MCNC: 202 MG/DL
GLUCOSE SERPL-MCNC: 75 MG/DL (ref 70–99)
HDLC SERPL-MCNC: 44 MG/DL
LDLC SERPL CALC-MCNC: 137 MG/DL
NONHDLC SERPL-MCNC: 158 MG/DL
TRIGL SERPL-MCNC: 105 MG/DL

## 2019-10-10 NOTE — RESULT ENCOUNTER NOTE
Dear Evelyn,     -LDL(bad) cholesterol level is elevated, HDL(good) cholesterol level is low which can increase your heart disease risk.  A diet high in fat and simple carbohydrates, genetics and being overweight can contribute to this. ADVISE: exercising 150 minutes of aerobic exercise per week (30 minutes for 5 days per week or 50 minutes for 3 days per week are options) and eating a low saturated fat/low carbohydrate diet are helpful to improve this. In 12 months, you should recheck your fasting cholesterol panel.    -Glucose (diabetic screening test) is normal.      Please send a [employer] message or call [phone]  if you have any questions.      Carmen Duval, PIPO, CNP  [facility]    If you have further questions about the interpretation of your labs, [email or web address] is a good website to check out for further information.

## 2019-11-21 ENCOUNTER — OFFICE VISIT (OUTPATIENT)
Dept: FAMILY MEDICINE | Facility: CLINIC | Age: 29
End: 2019-11-21
Payer: COMMERCIAL

## 2019-11-21 VITALS
WEIGHT: 184 LBS | BODY MASS INDEX: 29.7 KG/M2 | DIASTOLIC BLOOD PRESSURE: 64 MMHG | TEMPERATURE: 98.1 F | OXYGEN SATURATION: 99 % | HEART RATE: 81 BPM | SYSTOLIC BLOOD PRESSURE: 112 MMHG

## 2019-11-21 DIAGNOSIS — J06.9 VIRAL URI WITH COUGH: Primary | ICD-10-CM

## 2019-11-21 PROCEDURE — 99213 OFFICE O/P EST LOW 20 MIN: CPT | Performed by: NURSE PRACTITIONER

## 2019-11-21 RX ORDER — BENZONATATE 200 MG/1
200 CAPSULE ORAL 3 TIMES DAILY PRN
Qty: 30 CAPSULE | Refills: 0 | Status: SHIPPED | OUTPATIENT
Start: 2019-11-21 | End: 2020-03-08

## 2019-11-21 NOTE — PROGRESS NOTES
Subjective     Evelyn Miner is a 29 year old female who presents to clinic today for the following health issues:    HPI   Acute Illness   Acute illness concerns: URI  Onset: 4-5 days    Fever: no    Chills/Sweats: no    Headache (location?): YES    Sinus Pressure:YES    Conjunctivitis:  no    Ear Pain: YES- plugged    Rhinorrhea: YES    Congestion: YES    Sore Throat: YES     Cough: YES-productive of green sputum, shortness of breath    Wheeze: no    Decreased Appetite: no    Nausea: no    Vomiting: no    Diarrhea:  no    Dysuria/Freq.: no    Fatigue/Achiness: YES- fatigue    Sick/Strep Exposure: no     Therapies Tried and outcome: tylenol cold and flu severe it helped with nasal congestion    Last night had trouble breathing so she did not sleep well. Describes the breathing as more difficult but not emergent. No wheezing reported. Does have a history of childhood asthma.       Patient Active Problem List   Diagnosis     Pain in joint, lower leg     Other acne     Ureterolithiasis     CARDIOVASCULAR SCREENING; LDL GOAL LESS THAN 160     Anxiety     Encounter for triage in pregnant patient     S/P  section     Family history of malignant neoplasm of ovary     Past Surgical History:   Procedure Laterality Date     APPENDECTOMY       C NONSPECIFIC PROCEDURE  2006    Left knee exam under anesthesia and long-      SECTION N/A 3/3/2017    Procedure:  SECTION;  Surgeon: Humza Bustamante MD;  Location: [facility]     ENT SURGERY       HEAD & NECK SURGERY      [place] teeth extracted       Social History     Tobacco Use     Smoking status: Never Smoker     Smokeless tobacco: Never Used   Substance Use Topics     Alcohol use: No     Comment: rare     Family History   Problem Relation Age of Onset     C.A.D. Maternal Grandmother      Hypertension Maternal Grandmother      Blood Disease Maternal Grandfather         leukemia      Other Cancer Mother         ovarian - approx age 52      Thyroid Disease Mother         hyperthyroid     Hypertension Father      Hyperlipidemia Father      Anxiety Disorder Father      Alcoholism Father          Current Outpatient Medications   Medication Sig Dispense Refill     benzonatate (TESSALON) 200 MG capsule Take 1 capsule (200 mg) by mouth 3 times daily as needed for cough 30 capsule 0     No Known Allergies      Reviewed and updated as needed this visit by Provider         Review of Systems   ROS COMP: CONSTITUTIONAL: NEGATIVE for fever, chills, change in weight  EYES: NEGATIVE for vision changes or irritation  ENT/MOUTH: NEGATIVE for ear, mouth and throat problems  RESP: POSITIVE for significant cough and SOB  CV: NEGATIVE for chest pain, palpitations   GI: NEGATIVE for nausea, abdominal pain, heartburn, or change in bowel habits  MUSCULOSKELETAL: NEGATIVE for significant arthralgias or myalgia  NEURO: NEGATIVE for weakness, or paresthesias; POSITIVE: dizziness when bending over      Objective    /64   Pulse 81   Temp 98.1  F (36.7  C) (Oral)   Wt 83.5 kg (184 lb)   SpO2 99%   BMI 29.70 kg/m    Body mass index is 29.7 kg/m .  Physical Exam   GENERAL: Tired appearing, alert female in no acute distress  EYES: Eyes grossly normal to inspection, PERRL and conjunctivae and sclerae normal  HENT:Right ear canal full of cerumen with small opening where TM was visualized as pearly grey and bony landmarks seen; Right TM had injection but bony landmarks visible, nose and mouth without ulcers or lesions  NECK: no adenopathy, no asymmetry, masses, or scars   RESP: lungs clear to auscultation - no rales, rhonchi or wheezes  CV: regular rate and rhythm, normal S1 S2, no S3 or S4, no murmur, click or rub  PSYCH: mentation appears normal    Diagnostic Test Results:  none         Assessment & Plan     1. Viral URI with cough  Acute URI which was proceeded by a cold. Both daughters are also currently ill with a cold. Advised patient to utilize a nasal rinse to help  "with symptom management and tessalon pearls prescribed for cough. Physical exam was negative for respiratory distress, lung sounds clear and 02 99%. Offered an albuterol inhaler to help alleviate SOB while recovering from the cold but patient declined at this time.   - benzonatate (TESSALON) 200 MG capsule; Take 1 capsule (200 mg) by mouth 3 times daily as needed for cough  Dispense: 30 capsule; Refill: 0     BMI:   Estimated body mass index is 29.7 kg/m  as calculated from the following:    Height as of 10/8/19: 1.676 m (5' 6\").    Weight as of this encounter: 83.5 kg (184 lb).           Return in about 1 week (around 11/28/2019) for No improvement or sooner with worsening symptoms.    Alexandria Way RN, Student FNP, [employer]    History and physical examination done with student nurse practitioner.  Student acted as scribe for this encounter.  I agree with assessment and plan for this patient.     PIPO Valadez [facility] SAVAGE      "

## 2020-03-08 ENCOUNTER — OFFICE VISIT (OUTPATIENT)
Dept: URGENT CARE | Facility: URGENT CARE | Age: 30
End: 2020-03-08
Payer: COMMERCIAL

## 2020-03-08 VITALS
SYSTOLIC BLOOD PRESSURE: 102 MMHG | DIASTOLIC BLOOD PRESSURE: 62 MMHG | HEART RATE: 76 BPM | TEMPERATURE: 98.8 F | OXYGEN SATURATION: 96 % | WEIGHT: 180.3 LBS | BODY MASS INDEX: 29.1 KG/M2

## 2020-03-08 DIAGNOSIS — L08.9 INFECTED SKIN LESION: Primary | ICD-10-CM

## 2020-03-08 PROCEDURE — 99213 OFFICE O/P EST LOW 20 MIN: CPT | Performed by: PHYSICIAN ASSISTANT

## 2020-03-08 RX ORDER — CEPHALEXIN 500 MG/1
500 CAPSULE ORAL 3 TIMES DAILY
Qty: 21 CAPSULE | Refills: 0 | Status: SHIPPED | OUTPATIENT
Start: 2020-03-08 | End: 2020-03-15

## 2020-03-08 NOTE — PROGRESS NOTES
SUBJECTIVE:  Evelyn Mnier is a 29 year old female who comes in with infection on the right side of her face.   States that had a zit and tried to pop and now area is red and swollen, and tender. No drainage noted. No hx of MRSA.  Denies fevers.  Hurt to open mouth fully.  No pain in dental region.        Past Medical History:   Diagnosis Date     Gastroesophageal reflux disease      Headache(784.0)      JOINT PAIN-LOWER LEG 10/4/2006     Kidney stones      Migraine with aura      Other convulsions     as infant x1     Pregnancy     twins, PATRICK 3/2017     Thyroid disease     Enlarged thyroid     Unspecified disorder of ankle and foot joint 1996    ankle fracture lt     No current outpatient medications on file.     No current facility-administered medications for this visit.      Social History     Socioeconomic History     Marital status:      Spouse name: Not on file     Number of children: Not on file     Years of education: Not on file     Highest education level: Not on file   Occupational History     Occupation: [employer]     Employer: STUDENT   Social Needs     Financial resource strain: Not on file     Food insecurity     Worry: Not on file     Inability: Not on file     Transportation needs     Medical: Not on file     Non-medical: Not on file   Tobacco Use     Smoking status: Never Smoker     Smokeless tobacco: Never Used   Substance and Sexual Activity     Alcohol use: No     Comment: rare     Drug use: No     Sexual activity: Yes     Partners: Male   Lifestyle     Physical activity     Days per week: Not on file     Minutes per session: Not on file     Stress: Not on file   Relationships     Social connections     Talks on phone: Not on file     Gets together: Not on file     Attends Judaism service: Not on file     Active member of club or organization: Not on file     Attends meetings of clubs or organizations: Not on file     Relationship status: Not on file     Intimate partner violence      Fear of current or ex partner: Not on file     Emotionally abused: Not on file     Physically abused: Not on file     Forced sexual activity: Not on file   Other Topics Concern      Service Not Asked     Blood Transfusions Not Asked     Caffeine Concern Not Asked     Occupational Exposure Not Asked     Hobby Hazards Not Asked     Sleep Concern Not Asked     Stress Concern Not Asked     Weight Concern Not Asked     Special Diet No     Back Care Not Asked     Exercise No     Bike Helmet Not Asked     Seat Belt Not Asked     Self-Exams Not Asked     Parent/sibling w/ CABG, MI or angioplasty before 65F 55M? No   Social History Narrative     Not on file     ROS  Negative other than stated above    Exam:  GENERAL APPEARANCE: healthy, alert and no distress  EYES: EOMI,  PERRL  HENT: ear canals and TM's normal, nose and mouth without ulcers or lesions and no oral swelling noted and no signs of dental abscess   NECK: no adenopathy, no asymmetry, masses, or scars and thyroid normal to palpation  SKIN: right lower face with 1 inch area of erythema and induration surrounding single zit.  No drainage noted and no signs of abscess    assessment/plan:  (L08.9) Infected skin lesion  (primary encounter diagnosis)  Comment:   Plan: cephALEXin (KEFLEX) 500 MG capsule         Hygiene discussed and hot pack to area.  OTC med if needed and med as directed.   Follow-up with PCP if  Not improved

## 2021-01-15 ENCOUNTER — HEALTH MAINTENANCE LETTER (OUTPATIENT)
Age: 31
End: 2021-01-15

## 2021-06-24 ENCOUNTER — HOSPITAL ENCOUNTER (EMERGENCY)
Facility: CLINIC | Age: 31
Discharge: HOME | End: 2021-06-24
Attending: EMERGENCY MEDICINE | Admitting: EMERGENCY MEDICINE
Payer: COMMERCIAL

## 2021-06-24 VITALS
TEMPERATURE: 98.1 F | OXYGEN SATURATION: 100 % | DIASTOLIC BLOOD PRESSURE: 79 MMHG | SYSTOLIC BLOOD PRESSURE: 115 MMHG | HEART RATE: 68 BPM | RESPIRATION RATE: 20 BRPM

## 2021-06-24 DIAGNOSIS — N93.8 DYSFUNCTIONAL UTERINE BLEEDING: ICD-10-CM

## 2021-06-24 LAB
ANION GAP SERPL CALCULATED.3IONS-SCNC: 5 MMOL/L (ref 3–14)
B-HCG FREE SERPL-ACNC: <5 IU/L
BASOPHILS # BLD AUTO: 0.1 10E9/L (ref 0–0.2)
BASOPHILS NFR BLD AUTO: 0.7 %
BUN SERPL-MCNC: 14 MG/DL (ref 7–30)
CALCIUM SERPL-MCNC: 8.9 MG/DL (ref 8.5–10.1)
CHLORIDE SERPL-SCNC: 105 MMOL/L (ref 94–109)
CO2 SERPL-SCNC: 28 MMOL/L (ref 20–32)
CREAT SERPL-MCNC: 0.72 MG/DL (ref 0.52–1.04)
DIFFERENTIAL METHOD BLD: NORMAL
EOSINOPHIL # BLD AUTO: 0.3 10E9/L (ref 0–0.7)
EOSINOPHIL NFR BLD AUTO: 3.4 %
ERYTHROCYTE [DISTWIDTH] IN BLOOD BY AUTOMATED COUNT: 12 % (ref 10–15)
GFR SERPL CREATININE-BSD FRML MDRD: >90 ML/MIN/{1.73_M2}
GLUCOSE SERPL-MCNC: 78 MG/DL (ref 70–99)
HCT VFR BLD AUTO: 40 % (ref 35–47)
HGB BLD-MCNC: 13 G/DL (ref 11.7–15.7)
IMM GRANULOCYTES # BLD: 0 10E9/L (ref 0–0.4)
IMM GRANULOCYTES NFR BLD: 0.2 %
LYMPHOCYTES # BLD AUTO: 4.1 10E9/L (ref 0.8–5.3)
LYMPHOCYTES NFR BLD AUTO: 47.1 %
MCH RBC QN AUTO: 30.6 PG (ref 26.5–33)
MCHC RBC AUTO-ENTMCNC: 32.5 G/DL (ref 31.5–36.5)
MCV RBC AUTO: 94 FL (ref 78–100)
MONOCYTES # BLD AUTO: 0.5 10E9/L (ref 0–1.3)
MONOCYTES NFR BLD AUTO: 6 %
NEUTROPHILS # BLD AUTO: 3.7 10E9/L (ref 1.6–8.3)
NEUTROPHILS NFR BLD AUTO: 42.6 %
NRBC # BLD AUTO: 0 10*3/UL
NRBC BLD AUTO-RTO: 0 /100
PLATELET # BLD AUTO: 297 10E9/L (ref 150–450)
POTASSIUM SERPL-SCNC: 3.7 MMOL/L (ref 3.4–5.3)
RBC # BLD AUTO: 4.25 10E12/L (ref 3.8–5.2)
SODIUM SERPL-SCNC: 138 MMOL/L (ref 133–144)
WBC # BLD AUTO: 8.7 10E9/L (ref 4–11)

## 2021-06-24 PROCEDURE — 84702 CHORIONIC GONADOTROPIN TEST: CPT

## 2021-06-24 PROCEDURE — 99284 EMERGENCY DEPT VISIT MOD MDM: CPT | Mod: 25

## 2021-06-24 PROCEDURE — 258N000003 HC RX IP 258 OP 636: Performed by: EMERGENCY MEDICINE

## 2021-06-24 PROCEDURE — 80048 BASIC METABOLIC PNL TOTAL CA: CPT | Performed by: EMERGENCY MEDICINE

## 2021-06-24 PROCEDURE — 96360 HYDRATION IV INFUSION INIT: CPT

## 2021-06-24 PROCEDURE — 85025 COMPLETE CBC W/AUTO DIFF WBC: CPT | Performed by: EMERGENCY MEDICINE

## 2021-06-24 RX ORDER — KETOROLAC TROMETHAMINE 15 MG/ML
15 INJECTION, SOLUTION INTRAMUSCULAR; INTRAVENOUS ONCE
Status: DISCONTINUED | OUTPATIENT
Start: 2021-06-24 | End: 2021-06-24

## 2021-06-24 RX ADMIN — SODIUM CHLORIDE 1000 ML: 9 INJECTION, SOLUTION INTRAVENOUS at 22:03

## 2021-06-24 ASSESSMENT — ENCOUNTER SYMPTOMS
DIZZINESS: 1
FATIGUE: 1

## 2021-06-25 ASSESSMENT — ENCOUNTER SYMPTOMS
ABDOMINAL PAIN: 0
LIGHT-HEADEDNESS: 1

## 2021-06-25 NOTE — ED TRIAGE NOTES
"Pt states \"abnormally large\" amount of vaginal bleeding for 2 days pta. Pt states currently soaking through \"super tampon\" every hour. Pt also notes generalized weakness and dizziness when standing up. ABCs intact GCS 15  "

## 2021-06-25 NOTE — ED PROVIDER NOTES
History   Chief Complaint:  Vaginal Bleeding       The history is provided by the patient.      Evelyn Miner is a 31 year old female with history of GERD and IBS who presents with heavy menstrual period. She reports that it has been heavy for the last two days, which is abnormal for her. Over the last year she typically has one day of heavy bleeding during her menstrual cycle. Here she reports it is slowing, but for the past two days has had heavy bleeding. Today during her 12 hour shift she went through 8 super tampons. She also complains of lightheadedness and fatigue which began today. She also experienced shortness of breath while walking to her car today. Ten days ago on 6/14, she say her OB/GYN for irregular menstrual cycle. It is typically regular but this year has been irregular prompting her visit to the OB/GYN. She was prescribed ANNA which she plans to begin taking on Sunday. She has not taken anything for the bleeding, has not taken NSAIDs and is not on blood thinners. She denies any abdominal pain or cramping. She denies personal or family history of bleeding diathesis.       US Pelvis Complete W EV 9/17/2020  1. 4.5 cm simple appearing right ovarian cyst. This is likely benign in a premenopausal patient.  2. Normal left ovary and uterus.  Read per radiology.      Review of Systems   Constitutional: Positive for fatigue.   Cardiovascular: Negative for chest pain.   Gastrointestinal: Negative for abdominal pain.   Genitourinary: Positive for menstrual problem and vaginal bleeding. Negative for pelvic pain.   Neurological: Positive for dizziness and light-headedness. Negative for syncope.   All other systems reviewed and are negative.      Allergies:  The patient has no known allergies.     Medications:  The patient denies taking any prescription medications.     Past Medical History:    GERD  Kidney stones  Migraine  Thyroid disease  Anxiety  Varicella  IBS     Past Surgical History:     Appendectomy  Tonsillectomy  [place] teeth extraction  Knee joint manipulation   section      Family History:    Ovarian cancer -  Mother  Hyperthyroidism - mother  Hypertension - father  Hyperlipidemia - father  Anxiety - father  Alcoholism - father    Social History:  Presents to ED alone  Works at [employer]    Physical Exam     Patient Vitals for the past 24 hrs:   BP Temp Temp src Pulse Resp SpO2   21 2243 115/79 -- -- 68 -- 100 %   21 (!) 130/91 98.1  F (36.7  C) Oral 72 20 99 %       Physical Exam  General: Alert, no acute distress  HEENT:  Moist mucous membranes. Conjunctiva normal.   CV:  RRR, no m/r/g, skin warm and well perfused  Pulm:  CTAB, no wheezes/ronchi/rales.  No acute distress, breathing comfortably  GI:  Soft, nontender, nondistended.  No rebound or guarding.  Normal bowel sounds  MSK:  Moving all extremities.  No focal areas of edema, erythema, or tenderness  Skin:  WWP, no rashes, no lower extremity edema, skin color normal, no diaphoresis  Psych:  Well-appearing, normal affect, regular speech    Emergency Department Course     Laboratory:  CBC: WBC 8.7, HGB 13.0,     ISTAT HCG Quantitative POCT: <5.0    BMP: WNL (Creatinine 0.72)       Emergency Department Course:    Reviewed:  I reviewed nursing notes, vitals, past medical history and care everywhere.    Assessments:   I obtained history and examined the patient as noted above.       Interventions:  2203 NS, 1L, IV    Disposition:  The patient was discharged to home.       Impression & Plan       Medical Decision Making:  Evelyn Miner is a 31 year old female who presents for evaluation of heavy bleeding with associated fatigue, lightheadedness.  The differential diagnosis of vaginal bleeding is broad and includes uterine leiomyomas, endometrial polyp, adenomyosis, bleeding disorder, primary dysmenorrhea.  More rare but serious etiologies considered included ectopic pregnancy, pregnancy,  heterotopic pregnancy, etc.  In this patient, there are no signs of serious etiologies of vaginal bleeding.  She is not pregnant.  Hgb is normal despite heavy bleeding for the past 2 days.  She feels her bleeding is slowing down despite not having any intervetions in the ER or taking medication at home. She has had 9/17/2020 US without fibroids and declines repeat US imaging here.  She is hemodynamically stable and otherwise well appearing. With reasonable clinical certainty I feel she is safe to discharge home and she understands and agrees. Recommend NSAIDs.  If heavy bleeding continues, she will start her ANNA prescribed by OB.  Plan is home, close follow-up with OB, and return to ED for worsening pain, heavy vaginal bleeding (more than 1 pad soaked every hour). Patient comfortable with this plan. All questions were answered prior to discharge.        Covid-19  Evelyn Miner was evaluated during a global COVID-19 pandemic, which necessitated consideration that the patient might be at risk for infection with the SARS-CoV-2 virus that causes COVID-19.   Applicable protocols for evaluation were followed during the patient's care.       Diagnosis:    ICD-10-CM    1. Dysfunctional uterine bleeding  N93.8        Discharge Medications:  There are no discharge medications for this patient.      Scribe Disclosure:  IRamiro, am serving as a scribe at 9:39 PM on 6/24/2021 to document services personally performed by Corey Rice MD based on my observations and the provider's statements to me.     Scribe Disclosure:  I, Dyllan Flores, am serving as a scribe at 5:45 AM on 6/25/2021 to document services personally performed by Erwin Kidd MD based on my observations and the provider's statements to me.         Erwin Kidd MD  06/25/21 5040       Erwin Kidd MD  06/28/21 0368

## 2021-09-04 ENCOUNTER — HEALTH MAINTENANCE LETTER (OUTPATIENT)
Age: 31
End: 2021-09-04

## 2022-02-19 ENCOUNTER — HEALTH MAINTENANCE LETTER (OUTPATIENT)
Age: 32
End: 2022-02-19

## 2022-10-01 ENCOUNTER — APPOINTMENT (OUTPATIENT)
Dept: CT IMAGING | Facility: CLINIC | Age: 32
DRG: 872 | End: 2022-10-01
Attending: EMERGENCY MEDICINE
Payer: COMMERCIAL

## 2022-10-01 ENCOUNTER — HOSPITAL ENCOUNTER (INPATIENT)
Facility: CLINIC | Age: 32
LOS: 3 days | Discharge: HOME | DRG: 872 | End: 2022-10-04
Attending: EMERGENCY MEDICINE | Admitting: INTERNAL MEDICINE
Payer: COMMERCIAL

## 2022-10-01 DIAGNOSIS — N10 PYELONEPHRITIS, ACUTE: ICD-10-CM

## 2022-10-01 DIAGNOSIS — A41.51 SEPSIS DUE TO ESCHERICHIA COLI, UNSPECIFIED WHETHER ACUTE ORGAN DYSFUNCTION PRESENT (H): ICD-10-CM

## 2022-10-01 LAB
ALBUMIN SERPL-MCNC: 3.5 G/DL (ref 3.4–5)
ALBUMIN UR-MCNC: 20 MG/DL
ALP SERPL-CCNC: 64 U/L (ref 40–150)
ALT SERPL W P-5'-P-CCNC: 31 U/L (ref 0–50)
ANION GAP SERPL CALCULATED.3IONS-SCNC: 7 MMOL/L (ref 3–14)
APPEARANCE UR: CLEAR
AST SERPL W P-5'-P-CCNC: 14 U/L (ref 0–45)
BACTERIA #/AREA URNS HPF: ABNORMAL /HPF
BASOPHILS # BLD AUTO: 0 10E3/UL (ref 0–0.2)
BASOPHILS NFR BLD AUTO: 0 %
BILIRUB SERPL-MCNC: 0.5 MG/DL (ref 0.2–1.3)
BILIRUB UR QL STRIP: NEGATIVE
BUN SERPL-MCNC: 8 MG/DL (ref 7–30)
CALCIUM SERPL-MCNC: 8.3 MG/DL (ref 8.5–10.1)
CHLORIDE BLD-SCNC: 110 MMOL/L (ref 94–109)
CO2 SERPL-SCNC: 21 MMOL/L (ref 20–32)
COLOR UR AUTO: YELLOW
CREAT SERPL-MCNC: 0.75 MG/DL (ref 0.52–1.04)
EOSINOPHIL # BLD AUTO: 0 10E3/UL (ref 0–0.7)
EOSINOPHIL NFR BLD AUTO: 0 %
ERYTHROCYTE [DISTWIDTH] IN BLOOD BY AUTOMATED COUNT: 11.8 % (ref 10–15)
GFR SERPL CREATININE-BSD FRML MDRD: >90 ML/MIN/1.73M2
GLUCOSE BLD-MCNC: 121 MG/DL (ref 70–99)
GLUCOSE UR STRIP-MCNC: NEGATIVE MG/DL
HCG SERPL QL: NEGATIVE
HCO3 BLDV-SCNC: 23 MMOL/L (ref 21–28)
HCT VFR BLD AUTO: 36.3 % (ref 35–47)
HGB BLD-MCNC: 12.2 G/DL (ref 11.7–15.7)
HGB UR QL STRIP: ABNORMAL
HOLD SPECIMEN: NORMAL
IMM GRANULOCYTES # BLD: 0.1 10E3/UL
IMM GRANULOCYTES NFR BLD: 1 %
KETONES UR STRIP-MCNC: NEGATIVE MG/DL
LACTATE BLD-SCNC: 1.2 MMOL/L
LEUKOCYTE ESTERASE UR QL STRIP: ABNORMAL
LYMPHOCYTES # BLD AUTO: 1.3 10E3/UL (ref 0.8–5.3)
LYMPHOCYTES NFR BLD AUTO: 10 %
MCH RBC QN AUTO: 30.6 PG (ref 26.5–33)
MCHC RBC AUTO-ENTMCNC: 33.6 G/DL (ref 31.5–36.5)
MCV RBC AUTO: 91 FL (ref 78–100)
MONOCYTES # BLD AUTO: 1.4 10E3/UL (ref 0–1.3)
MONOCYTES NFR BLD AUTO: 11 %
MUCOUS THREADS #/AREA URNS LPF: PRESENT /LPF
NEUTROPHILS # BLD AUTO: 10.5 10E3/UL (ref 1.6–8.3)
NEUTROPHILS NFR BLD AUTO: 78 %
NITRATE UR QL: NEGATIVE
NRBC # BLD AUTO: 0 10E3/UL
NRBC BLD AUTO-RTO: 0 /100
PCO2 BLDV: 36 MM HG (ref 40–50)
PH BLDV: 7.41 [PH] (ref 7.32–7.43)
PH UR STRIP: 6.5 [PH] (ref 5–7)
PLATELET # BLD AUTO: 274 10E3/UL (ref 150–450)
PO2 BLDV: 45 MM HG (ref 25–47)
POTASSIUM BLD-SCNC: 3.5 MMOL/L (ref 3.4–5.3)
PROT SERPL-MCNC: 7.1 G/DL (ref 6.8–8.8)
RBC # BLD AUTO: 3.99 10E6/UL (ref 3.8–5.2)
RBC URINE: 13 /HPF
SAO2 % BLDV: 81 % (ref 94–100)
SARS-COV-2 RNA RESP QL NAA+PROBE: NEGATIVE
SODIUM SERPL-SCNC: 138 MMOL/L (ref 133–144)
SP GR UR STRIP: 1.02 (ref 1–1.03)
SQUAMOUS EPITHELIAL: 1 /HPF
TRANSITIONAL EPI: 1 /HPF
UROBILINOGEN UR STRIP-MCNC: 2 MG/DL
WBC # BLD AUTO: 13.3 10E3/UL (ref 4–11)
WBC URINE: 113 /HPF

## 2022-10-01 PROCEDURE — 82040 ASSAY OF SERUM ALBUMIN: CPT | Performed by: EMERGENCY MEDICINE

## 2022-10-01 PROCEDURE — 258N000003 HC RX IP 258 OP 636: Performed by: EMERGENCY MEDICINE

## 2022-10-01 PROCEDURE — 87040 BLOOD CULTURE FOR BACTERIA: CPT | Performed by: EMERGENCY MEDICINE

## 2022-10-01 PROCEDURE — 250N000013 HC RX MED GY IP 250 OP 250 PS 637: Performed by: EMERGENCY MEDICINE

## 2022-10-01 PROCEDURE — 96361 HYDRATE IV INFUSION ADD-ON: CPT

## 2022-10-01 PROCEDURE — 82803 BLOOD GASES ANY COMBINATION: CPT

## 2022-10-01 PROCEDURE — 96375 TX/PRO/DX INJ NEW DRUG ADDON: CPT

## 2022-10-01 PROCEDURE — 84703 CHORIONIC GONADOTROPIN ASSAY: CPT | Performed by: EMERGENCY MEDICINE

## 2022-10-01 PROCEDURE — 80053 COMPREHEN METABOLIC PANEL: CPT | Performed by: EMERGENCY MEDICINE

## 2022-10-01 PROCEDURE — U0005 INFEC AGEN DETEC AMPLI PROBE: HCPCS | Performed by: EMERGENCY MEDICINE

## 2022-10-01 PROCEDURE — 99223 1ST HOSP IP/OBS HIGH 75: CPT | Mod: AI | Performed by: INTERNAL MEDICINE

## 2022-10-01 PROCEDURE — 99285 EMERGENCY DEPT VISIT HI MDM: CPT | Mod: 25

## 2022-10-01 PROCEDURE — 85025 COMPLETE CBC W/AUTO DIFF WBC: CPT | Performed by: EMERGENCY MEDICINE

## 2022-10-01 PROCEDURE — 120N000001 HC R&B MED SURG/OB

## 2022-10-01 PROCEDURE — 87086 URINE CULTURE/COLONY COUNT: CPT | Performed by: EMERGENCY MEDICINE

## 2022-10-01 PROCEDURE — 36415 COLL VENOUS BLD VENIPUNCTURE: CPT | Performed by: EMERGENCY MEDICINE

## 2022-10-01 PROCEDURE — 74176 CT ABD & PELVIS W/O CONTRAST: CPT

## 2022-10-01 PROCEDURE — 81001 URINALYSIS AUTO W/SCOPE: CPT | Performed by: EMERGENCY MEDICINE

## 2022-10-01 PROCEDURE — 250N000011 HC RX IP 250 OP 636: Performed by: EMERGENCY MEDICINE

## 2022-10-01 PROCEDURE — 96365 THER/PROPH/DIAG IV INF INIT: CPT

## 2022-10-01 PROCEDURE — 83605 ASSAY OF LACTIC ACID: CPT

## 2022-10-01 RX ORDER — ONDANSETRON 2 MG/ML
4 INJECTION INTRAMUSCULAR; INTRAVENOUS ONCE
Status: COMPLETED | OUTPATIENT
Start: 2022-10-01 | End: 2022-10-01

## 2022-10-01 RX ORDER — ACETAMINOPHEN 500 MG
1000 TABLET ORAL ONCE
Status: COMPLETED | OUTPATIENT
Start: 2022-10-01 | End: 2022-10-01

## 2022-10-01 RX ADMIN — SODIUM CHLORIDE 1000 ML: 9 INJECTION, SOLUTION INTRAVENOUS at 22:55

## 2022-10-01 RX ADMIN — ONDANSETRON 4 MG: 2 INJECTION INTRAMUSCULAR; INTRAVENOUS at 20:58

## 2022-10-01 RX ADMIN — ACETAMINOPHEN 1000 MG: 500 TABLET ORAL at 21:07

## 2022-10-01 RX ADMIN — SODIUM CHLORIDE 2000 ML: 9 INJECTION, SOLUTION INTRAVENOUS at 21:02

## 2022-10-01 ASSESSMENT — ENCOUNTER SYMPTOMS
DYSURIA: 1
FREQUENCY: 1
WEAKNESS: 1
FLANK PAIN: 1
FEVER: 1

## 2022-10-01 ASSESSMENT — ACTIVITIES OF DAILY LIVING (ADL)
ADLS_ACUITY_SCORE: 37
ADLS_ACUITY_SCORE: 37

## 2022-10-02 LAB
ERYTHROCYTE [DISTWIDTH] IN BLOOD BY AUTOMATED COUNT: 12.1 % (ref 10–15)
HCT VFR BLD AUTO: 34.3 % (ref 35–47)
HGB BLD-MCNC: 11.4 G/DL (ref 11.7–15.7)
MCH RBC QN AUTO: 30.3 PG (ref 26.5–33)
MCHC RBC AUTO-ENTMCNC: 33.2 G/DL (ref 31.5–36.5)
MCV RBC AUTO: 91 FL (ref 78–100)
PLATELET # BLD AUTO: 245 10E3/UL (ref 150–450)
RBC # BLD AUTO: 3.76 10E6/UL (ref 3.8–5.2)
WBC # BLD AUTO: 12 10E3/UL (ref 4–11)

## 2022-10-02 PROCEDURE — 99233 SBSQ HOSP IP/OBS HIGH 50: CPT | Performed by: STUDENT IN AN ORGANIZED HEALTH CARE EDUCATION/TRAINING PROGRAM

## 2022-10-02 PROCEDURE — 85027 COMPLETE CBC AUTOMATED: CPT | Performed by: INTERNAL MEDICINE

## 2022-10-02 PROCEDURE — 36415 COLL VENOUS BLD VENIPUNCTURE: CPT | Performed by: INTERNAL MEDICINE

## 2022-10-02 PROCEDURE — 258N000003 HC RX IP 258 OP 636: Performed by: INTERNAL MEDICINE

## 2022-10-02 PROCEDURE — 258N000003 HC RX IP 258 OP 636: Performed by: STUDENT IN AN ORGANIZED HEALTH CARE EDUCATION/TRAINING PROGRAM

## 2022-10-02 PROCEDURE — 250N000011 HC RX IP 250 OP 636: Performed by: INTERNAL MEDICINE

## 2022-10-02 PROCEDURE — 250N000013 HC RX MED GY IP 250 OP 250 PS 637: Performed by: INTERNAL MEDICINE

## 2022-10-02 PROCEDURE — 120N000001 HC R&B MED SURG/OB

## 2022-10-02 RX ORDER — IBUPROFEN 200 MG
CAPSULE ORAL DAILY
COMMUNITY

## 2022-10-02 RX ORDER — ACETAMINOPHEN 650 MG/1
650 SUPPOSITORY RECTAL EVERY 6 HOURS PRN
Status: DISCONTINUED | OUTPATIENT
Start: 2022-10-02 | End: 2022-10-04 | Stop reason: HOSPADM

## 2022-10-02 RX ORDER — CEPHALEXIN 500 MG/1
500 CAPSULE ORAL 2 TIMES DAILY
Status: ON HOLD | COMMUNITY
End: 2022-10-04

## 2022-10-02 RX ORDER — CHOLECALCIFEROL (VITAMIN D3) 50 MCG
1 TABLET ORAL DAILY
COMMUNITY

## 2022-10-02 RX ORDER — IBUPROFEN 200 MG
800 TABLET ORAL EVERY 6 HOURS PRN
COMMUNITY

## 2022-10-02 RX ORDER — SODIUM CHLORIDE 9 MG/ML
INJECTION, SOLUTION INTRAVENOUS CONTINUOUS
Status: DISCONTINUED | OUTPATIENT
Start: 2022-10-02 | End: 2022-10-02

## 2022-10-02 RX ORDER — ONDANSETRON 2 MG/ML
4 INJECTION INTRAMUSCULAR; INTRAVENOUS EVERY 6 HOURS PRN
Status: DISCONTINUED | OUTPATIENT
Start: 2022-10-02 | End: 2022-10-04 | Stop reason: HOSPADM

## 2022-10-02 RX ORDER — IBUPROFEN 600 MG/1
600 TABLET, FILM COATED ORAL EVERY 6 HOURS PRN
Status: DISCONTINUED | OUTPATIENT
Start: 2022-10-02 | End: 2022-10-04 | Stop reason: HOSPADM

## 2022-10-02 RX ORDER — LIDOCAINE 40 MG/G
CREAM TOPICAL
Status: DISCONTINUED | OUTPATIENT
Start: 2022-10-02 | End: 2022-10-04 | Stop reason: HOSPADM

## 2022-10-02 RX ORDER — ONDANSETRON 4 MG/1
4 TABLET, ORALLY DISINTEGRATING ORAL EVERY 6 HOURS PRN
Status: DISCONTINUED | OUTPATIENT
Start: 2022-10-02 | End: 2022-10-04 | Stop reason: HOSPADM

## 2022-10-02 RX ORDER — PHENAZOPYRIDINE HYDROCHLORIDE 100 MG/1
100 TABLET, FILM COATED ORAL 3 TIMES DAILY PRN
Status: DISCONTINUED | OUTPATIENT
Start: 2022-10-02 | End: 2022-10-04 | Stop reason: HOSPADM

## 2022-10-02 RX ORDER — RIZATRIPTAN BENZOATE 5 MG/1
5 TABLET ORAL
COMMUNITY

## 2022-10-02 RX ORDER — CEFTRIAXONE 2 G/1
2 INJECTION, POWDER, FOR SOLUTION INTRAMUSCULAR; INTRAVENOUS EVERY EVENING
Status: DISCONTINUED | OUTPATIENT
Start: 2022-10-02 | End: 2022-10-04 | Stop reason: HOSPADM

## 2022-10-02 RX ORDER — SODIUM CHLORIDE, SODIUM LACTATE, POTASSIUM CHLORIDE, CALCIUM CHLORIDE 600; 310; 30; 20 MG/100ML; MG/100ML; MG/100ML; MG/100ML
INJECTION, SOLUTION INTRAVENOUS CONTINUOUS
Status: DISCONTINUED | OUTPATIENT
Start: 2022-10-02 | End: 2022-10-04 | Stop reason: HOSPADM

## 2022-10-02 RX ORDER — ACETAMINOPHEN 500 MG
1000 TABLET ORAL EVERY 6 HOURS PRN
COMMUNITY

## 2022-10-02 RX ORDER — RIZATRIPTAN BENZOATE 5 MG/1
5 TABLET ORAL
Status: DISCONTINUED | OUTPATIENT
Start: 2022-10-02 | End: 2022-10-04 | Stop reason: HOSPADM

## 2022-10-02 RX ORDER — ACETAMINOPHEN 325 MG/1
650 TABLET ORAL EVERY 6 HOURS PRN
Status: DISCONTINUED | OUTPATIENT
Start: 2022-10-02 | End: 2022-10-04 | Stop reason: HOSPADM

## 2022-10-02 RX ADMIN — ACETAMINOPHEN 650 MG: 325 TABLET, FILM COATED ORAL at 16:41

## 2022-10-02 RX ADMIN — ACETAMINOPHEN 650 MG: 325 TABLET, FILM COATED ORAL at 09:45

## 2022-10-02 RX ADMIN — ACETAMINOPHEN 650 MG: 325 TABLET, FILM COATED ORAL at 03:36

## 2022-10-02 RX ADMIN — ONDANSETRON 4 MG: 2 INJECTION INTRAMUSCULAR; INTRAVENOUS at 04:27

## 2022-10-02 RX ADMIN — IBUPROFEN 600 MG: 600 TABLET ORAL at 13:19

## 2022-10-02 RX ADMIN — SODIUM CHLORIDE, POTASSIUM CHLORIDE, SODIUM LACTATE AND CALCIUM CHLORIDE: 600; 310; 30; 20 INJECTION, SOLUTION INTRAVENOUS at 16:06

## 2022-10-02 RX ADMIN — SODIUM CHLORIDE, PRESERVATIVE FREE: 5 INJECTION INTRAVENOUS at 03:23

## 2022-10-02 RX ADMIN — CEFTRIAXONE SODIUM 2 G: 2 INJECTION, POWDER, FOR SOLUTION INTRAMUSCULAR; INTRAVENOUS at 00:52

## 2022-10-02 RX ADMIN — IBUPROFEN 600 MG: 600 TABLET ORAL at 02:51

## 2022-10-02 ASSESSMENT — ACTIVITIES OF DAILY LIVING (ADL)
DIFFICULTY_EATING/SWALLOWING: NO
WALKING_OR_CLIMBING_STAIRS_DIFFICULTY: NO
ADLS_ACUITY_SCORE: 37
ADLS_ACUITY_SCORE: 33
TOILETING_ISSUES: NO
ADLS_ACUITY_SCORE: 20
FALL_HISTORY_WITHIN_LAST_SIX_MONTHS: YES
CONCENTRATING,_REMEMBERING_OR_MAKING_DECISIONS_DIFFICULTY: NO
CHANGE_IN_FUNCTIONAL_STATUS_SINCE_ONSET_OF_CURRENT_ILLNESS/INJURY: NO
ADLS_ACUITY_SCORE: 37
NUMBER_OF_TIMES_PATIENT_HAS_FALLEN_WITHIN_LAST_SIX_MONTHS: 0
ADLS_ACUITY_SCORE: 33
ADLS_ACUITY_SCORE: 20
ADLS_ACUITY_SCORE: 33
WEAR_GLASSES_OR_BLIND: NO
ADLS_ACUITY_SCORE: 33
DOING_ERRANDS_INDEPENDENTLY_DIFFICULTY: NO
ADLS_ACUITY_SCORE: 20
DRESSING/BATHING_DIFFICULTY: NO

## 2022-10-02 NOTE — ED PROVIDER NOTES
"  History   Chief Complaint:  Fever     The history is provided by the patient.      Evelyn Miner is a 32 year old female with history of ureterolithiasis who presents with a high fever and weakness. She was diagnosed with a UTI yesterday after experiencing dysuria and frequency. She went to  today and was diagnosed with a kidney infection. She received 2 g of ceftriaxone at the [facility].  She is currently febrile and is experiencing flank pain. She has a prior history of kidney stones.  She last took Tylenol at 1500.    Review of Systems   Constitutional: Positive for fever.   Genitourinary: Positive for dysuria, flank pain and frequency.   Neurological: Positive for weakness.   All other systems reviewed and are negative.    Allergies:  The patient has no known allergies.     Medications:  Keflex  Ozempic     Past Medical History:     Ureterolithiasis  Anxiety   IBS  GERD    Past Surgical History:    Appendectomy    [place] teeth extraction     Family History:    Ovarian cancer  Hyperthyroidism  Hypertension  Hyperlipidemia  Alcoholism  Anxiety    Social History:  The patient presents to the ED with a family member.    Physical Exam     Patient Vitals for the past 24 hrs:   BP Temp Temp src Pulse Resp SpO2 Height Weight   10/01/22 2300 99/64 -- -- 104 24 96 % -- --   10/01/22 2225 -- 100.1  F (37.8  C) Oral -- -- -- -- --   10/01/22 2215 97/60 -- -- 114 10 -- -- --   10/01/22 2208 105/58 -- -- 119 -- -- -- --   10/01/22 2114 112/66 -- -- 115 -- -- -- --   10/01/22 2034 126/76 (!) 103  F (39.4  C) Oral (!) 130 20 98 % 1.676 m (5' 6\") 97.5 kg (215 lb)       Physical Exam  Eyes:  The pupils are equal and round    Conjunctivae and sclerae are normal  ENT:    The nose is normal    Pinnae are normal  CV:  Tachycardic and regular    No edema  Resp:  Lungs are clear    Non-labored    No rales    No wheezing   GI:  Abdomen is soft and non-tenderr, there is no rigidity    No distension    No rebound " tenderness   MS:  Normal muscular tone    No asymmetric leg swelling    Mild bilateral flank tenderness  Skin:  No rash or acute skin lesions noted  Neuro:   Awake, alert.      Speech is normal and fluent.    Face is symmetric.     Moves all extremities    Emergency Department Course     Imaging:  CT Abdomen Pelvis w/o Contrast   Final Result   IMPRESSION:    1.  Mild prominence of the right renal collecting system, without obstructing stone or mass lesion identified. Correlate with urinalysis to exclude an urinary tract infection.   2.  Possibly mild proctocolitis involving the rectum and distal sigmoid colon.   3.  Bilateral nonobstructing renal stones.   4.  Hepatomegaly and diffuse hepatic steatosis.   5.  Anemia.              Report per radiology    Laboratory:  Labs Ordered and Resulted from Time of ED Arrival to Time of ED Departure   COMPREHENSIVE METABOLIC PANEL - Abnormal       Result Value    Sodium 138      Potassium 3.5      Chloride 110 (*)     Carbon Dioxide (CO2) 21      Anion Gap 7      Urea Nitrogen 8      Creatinine 0.75      Calcium 8.3 (*)     Glucose 121 (*)     Alkaline Phosphatase 64      AST 14      ALT 31      Protein Total 7.1      Albumin 3.5      Bilirubin Total 0.5      GFR Estimate >90     ROUTINE UA WITH MICROSCOPIC - Abnormal    Color Urine Yellow      Appearance Urine Clear      Glucose Urine Negative      Bilirubin Urine Negative      Ketones Urine Negative      Specific Gravity Urine 1.019      Blood Urine Trace (*)     pH Urine 6.5      Protein Albumin Urine 20  (*)     Urobilinogen Urine 2.0      Nitrite Urine Negative      Leukocyte Esterase Urine Moderate (*)     Bacteria Urine Few (*)     Mucus Urine Present (*)     RBC Urine 13 (*)     WBC Urine 113 (*)     Squamous Epithelials Urine 1      Transitional Epithelials Urine 1     CBC WITH PLATELETS AND DIFFERENTIAL - Abnormal    WBC Count 13.3 (*)     RBC Count 3.99      Hemoglobin 12.2      Hematocrit 36.3      MCV 91      MCH  30.6      MCHC 33.6      RDW 11.8      Platelet Count 274      % Neutrophils 78      % Lymphocytes 10      % Monocytes 11      % Eosinophils 0      % Basophils 0      % Immature Granulocytes 1      NRBCs per 100 WBC 0      Absolute Neutrophils 10.5 (*)     Absolute Lymphocytes 1.3      Absolute Monocytes 1.4 (*)     Absolute Eosinophils 0.0      Absolute Basophils 0.0      Absolute Immature Granulocytes 0.1      Absolute NRBCs 0.0     ISTAT GASES LACTATE VENOUS POCT - Abnormal    Lactic Acid POCT 1.2      Bicarbonate Venous POCT 23      O2 Sat, Venous POCT 81 (*)     pCO2V Venous POCT 36 (*)     pH Venous POCT 7.41      pO2 Venous POCT 45     HCG QUALITATIVE PREGNANCY - Normal    hCG Serum Qualitative Negative     COVID-19 VIRUS (CORONAVIRUS) BY PCR   URINE CULTURE   BLOOD CULTURE   BLOOD CULTURE      Emergency Department Course:    Reviewed:  I reviewed nursing notes, vitals, past medical history and Care Everywhere    Assessments:  2045 I obtained history and examined the patient as noted above.    I rechecked the patient and explained findings.     Consults:  2315 I spoke with Dr. Sullivan from the hospitalist service regarding the patient's presentation, findings here in the ED, and plan of care.       Interventions:  2058 Zofran 4 mg IV  2107 Tylenol 1 g PO  2229 NS 1 L IV    Disposition:  The patient was admitted to the hospital under the care of Dr. Sullivan.     Impression & Plan     Medical Decision Making:  Evelyn is a 32 year old who presents to the ED with concerns about a UTI. She is febrile and is feeling very ill. She was diagnosed with a UTI after a visit on Wednesday. She started and received two oral doses of medication and went to the  today because she wasn't feeling well. There were labs obtained and was given a dose of rocephin. Since going home she has developed a fever and is feeling worse again. On arrival her heart rate is in the 130s. Her history is concerning for a UTI and she has previously  had kidney stones so a CT scan of her abdomen w/o contrast was obtained. No ureteral stones were identified but there were stones within her kidney. Possible mild proctocolitis noted but symptoms more compatible with UTI. Patient is still tachycardic in the 110s with BP in the 90s. Given these findings and her continuing to not feel well, I will plan for admission. Patient agreeable with plan. She was not given antibiotics in the ED because she was given rocephin, which appears appropriate based on prior urine culture taken in the UC and earlier today.    Diagnosis:    ICD-10-CM    1. Sepsis due to Escherichia coli, unspecified whether acute organ dysfunction present (H)  A41.51    2. Pyelonephritis, acute  N10        Scribe Disclosure:  I, Abel Olivas, am serving as a scribe at 8:44 PM on 10/1/2022 to document services personally performed by Michael Watson MD based on my observations and the provider's statements to me.          Michael Watson MD  10/02/22 0046

## 2022-10-02 NOTE — PLAN OF CARE
Goal Outcome Evaluation:      Pt arrived on floor 0320  Pt A&Ox4. Up independent. Iv infusing NS. VSS- except temp 102.5 and tachycardiac. Temp 99.7 after tylenol given. Pain managed with tylenol. PRN zofran x1 given. Reg diet. Continue to monitor.

## 2022-10-02 NOTE — H&P
"[facility]    History and Physical - Hospitalist Service       Date of Admission:  10/1/2022    Assessment & Plan    Evelyn Miner is a very pleasant 32 year old female admitted on 10/1/2022 roman sepsis due to pyelonephritis.     Sepsis secondary to UTI, pyelonephritis  Bilateral non-obstructing kindney stones  Meets sepsis criteria with fever 103 F, tachycardia, leukocytosis. Several liters of IV fluids given in ED course. Had rocephin IM at 2 PM at the [facility] in [place]. Urine culture from 9/28/22 with >100k e. Coli pan sensitive.   -admit to inpatient bed  -antibiotics: IV ceftriaxone pending cultures  -follow up blood and urine cultures  -IV fluids  -As needed acetaminophen and ibuprofen for fever/pain  -As needed phenazopyridine for bladder spasm pain (will turn urine bright orange color)       Diet: regular  DVT Prophylaxis: Pneumatic Compression Devices and Ambulate every shift  Tyler Catheter: Not present  Central Lines: None  Cardiac Monitoring: None  Code Status: Full    Clinically Significant Risk Factors Present on Admission          # Hypocalcemia: Ca = 8.3 mg/dL (Ref range: 8.5 - 10.1 mg/dL) and/or iCa = N/A on admission, will replace as needed           # Obesity: Estimated body mass index is 34.7 kg/m  as calculated from the following:    Height as of this encounter: 1.676 m (5' 6\").    Weight as of this encounter: 97.5 kg (215 lb).        Disposition Plan      Expected Discharge Date: 10/03/2022                The patient's care was discussed with the Attending Physician, Dr. Watson, Bedside Nurse and Patient.    Oksana Sullivan MD  Hospitalist Service  [facility]  Securely message with the Vocera Web Console (learn more here)  Text page via [address] Paging/Directory   ______________________________________________________________________    Chief Complaint   Fever, chills, flank pain, fatigue    History is obtained from the patient, electronic " health record and emergency department physician    History of Present Illness   Evelyn Miner is a 32 year old female who presents with fever,, chills.  She has been seen numerous times at different healthcare facilities in the last 3 days.  Was seen at a [facility] facility and a urine culture was obtained.  This grew greater than 100,000 colonies E. coli which was pansensitive to all tested antibiotics.  She was prescribed Keflex and somehow between  and now 10/1 she had only taken 2 doses.  Since then she has been seen in the urgency room in [place] on  twice.  The second time she left without being seen and decided to come to the emergency department on 10/1/2022 here.  She has had fevers up to 103 and 104 Fahrenheit at home and felt palpitations.  Has noticed dysuria and some blood tinge to her urine.  Mild nausea but no vomiting.     Discussed with Dr. Watson from the Emergency Department. Labs and available imaging reviewed.     Review of Systems    The 10 point Review of Systems is negative other than noted in the HPI or here.     Past Medical History    I have reviewed this patient's medical history and updated it with pertinent information if needed.   Past Medical History:   Diagnosis Date     Gastroesophageal reflux disease      Headache(784.0)      JOINT PAIN-LOWER LEG 10/4/2006     Kidney stones      Migraine with aura      Other convulsions     as infant x1     Pregnancy     twins, PATRICK 3/2017     Thyroid disease     Enlarged thyroid     Unspecified disorder of ankle and foot joint     ankle fracture lt       Past Surgical History   I have reviewed this patient's surgical history and updated it with pertinent information if needed.  Past Surgical History:   Procedure Laterality Date     APPENDECTOMY        SECTION N/A 3/3/2017    Procedure:  SECTION;  Surgeon: Humza Bustamante MD;  Location:  L+D     ENT SURGERY       HEAD & NECK SURGERY      [place] teeth  extracted     [facility] NONSPECIFIC PROCEDURE      Left knee exam under anesthesia and long-       Social History   I have reviewed this patient's social history and updated it with pertinent information if needed.  Social History     Tobacco Use     Smoking status: Never Smoker     Smokeless tobacco: Never Used   Substance Use Topics     Alcohol use: No     Comment: rare     Drug use: No       Family History   I have reviewed this patient's family history and updated it with pertinent information if needed.  Family History   Problem Relation Age of Onset     C.A.D. Maternal Grandmother      Hypertension Maternal Grandmother      Blood Disease Maternal Grandfather         leukemia      Other Cancer Mother         ovarian - approx age 52     Thyroid Disease Mother         hyperthyroid     Hypertension Father      Hyperlipidemia Father      Anxiety Disorder Father      Alcoholism Father        Prior to Admission Medications      Keflex  Acetaminophen    Allergies   No Known Allergies    Physical Exam   Vital Signs: Temp: 100.1  F (37.8  C) Temp src: Oral BP: 99/64 Pulse: 104   Resp: 24 SpO2: 96 % O2 Device: None (Room air)    Weight: 215 lbs 0 oz    Constitutional: awake, alert, cooperative, no apparent distress, and appears stated age  Eyes: sclera clear and conjunctiva normal  ENT: Normocephalic, without obvious abnormality, atraumatic, external ears without lesions, oral pharynx with moist mucous membranes  Hematologic / Lymphatic: no cervical lymphadenopathy and no supraclavicular lymphadenopathy  Respiratory: No increased work of breathing, good air exchange, clear to auscultation bilaterally, no crackles or wheezing  Cardiovascular: tachycardic with regular rhythm, no murmur noted and no edema  GI: normal bowel sounds, bilateral CVAT, abdomen nontender, no rebound or guarding  Skin: no redness, warmth, or swelling, no rashes and no lesions  Musculoskeletal: full range of motion noted  motor strength is 5  out of 5 all extremities bilaterally  tone is normal    Data   Data reviewed today: I reviewed all medications, new labs and imaging results over the last 24 hours. I personally reviewed the abd CT image(s) showing priminent right renal collecting system, no obstructing stone. .    Recent Labs   Lab 10/01/22  2104   WBC 13.3*   HGB 12.2   MCV 91         POTASSIUM 3.5   CHLORIDE 110*   CO2 21   BUN 8   CR 0.75   ANIONGAP 7   EDY 8.3*   *   ALBUMIN 3.5   PROTTOTAL 7.1   BILITOTAL 0.5   ALKPHOS 64   ALT 31   AST 14     Recent Results (from the past 24 hour(s))   CT Abdomen Pelvis w/o Contrast    Narrative    EXAM: CT ABDOMEN PELVIS W/O CONTRAST  LOCATION: [facility]  DATE/TIME: 10/1/2022 10:03 PM    INDICATION: Flank pain; urinary tract infection.  COMPARISON: CT chest 08/25/2022.  TECHNIQUE: CT scan of the abdomen and pelvis was performed without IV contrast. Multiplanar reformats were obtained. Dose reduction techniques were used.  CONTRAST: None.    FINDINGS:      Absence of intravenous contrast limits the sensitivity of this examination for detection of infectious/inflammatory change, post traumatic abnormalities, vascular abnormalities, and visceral lesions.    LOWER CHEST: Small hiatal hernia. Low attenuation of the intracardiac blood pool, compatible with anemia.    HEPATOBILIARY: Hepatomegaly and diffuse hepatic steatosis.    Gallbladder is normal.    No intrahepatic or intrahepatic biliary ductal dilatation.    PANCREAS: Normal attenuation. No peripancreatic inflammatory fat stranding.    SPLEEN: Normal attenuation. Normal size.    ADRENAL GLANDS: Normal.    KIDNEYS: Mild prominence of the right renal collecting system, without obstructing stone or mass lesion identified.    Bilateral nonobstructing renal stones.    Urinary bladder is unremarkable.    PELVIC ORGANS: No suspicious abnormality. A left adnexal cystic lesion measures up to 3 cm, within physiologic  limits.    BOWEL: Mild wall thickening of the rectum and distal sigmoid colon, likely attributable to incomplete distention. However, there is the appearance of mild extraluminal fat stranding, suggesting a possible superimposed mild proctocolitis.    No intraperitoneal free fluid or free air.    LYMPH NODES: Subcentimeter para-aortic and mesenteric lymph nodes, not meeting size criteria for lymphadenopathy.    VASCULATURE: No abdominal aortic aneurysm.    MUSCULOSKELETAL: No suspicious abnormality. [Limbus vertebra at the L3 level.    OTHER: No additionally significant abnormalities.      Impression    IMPRESSION:   1.  Mild prominence of the right renal collecting system, without obstructing stone or mass lesion identified. Correlate with urinalysis to exclude an urinary tract infection.  2.  Possibly mild proctocolitis involving the rectum and distal sigmoid colon.  3.  Bilateral nonobstructing renal stones.  4.  Hepatomegaly and diffuse hepatic steatosis.  5.  Anemia.

## 2022-10-02 NOTE — ED TRIAGE NOTES
Patient was Dx on Friday with a UTI. She was seen at the urgency room and Dx with a kidney infection. She is here due to having a fever and feeling weak.

## 2022-10-02 NOTE — PROGRESS NOTES
RECEIVING UNIT ED HANDOFF REVIEW    ED Nurse Handoff Report was reviewed by: Sherron Rincon RN on October 2, 2022 at 2:53 AM

## 2022-10-02 NOTE — PLAN OF CARE
A/OX4, denies pain. VSS except with slight temp of 99 with episode of chills. Ibuprofen and tylenol given.  Up independent in room. Not much appetite today. On IV antibiotics. Will continue to monitor.

## 2022-10-02 NOTE — PROGRESS NOTES
"[facility]    Medicine Progress Note - Hospitalist Service    Date of Admission:  10/1/2022    Assessment & Plan          Evelyn Miner is a very pleasant 32 year old female admitted on 10/1/2022 with sepsis due to pyelonephritis.      Sepsis secondary to UTI, pyelonephritis  Bilateral non-obstructing kindney stones  Meets sepsis criteria with fever 103 F, tachycardia, leukocytosis. Several liters of IV fluids given in ED course. Had rocephin IM at 2 PM at the [facility] in [place]. Urine culture from 9/28/22 with >100k e. Coli pan sensitive.   -antibiotics: IV ceftriaxone pending cultures  -follow up blood and urine cultures  -IV fluids  -As needed acetaminophen and ibuprofen for fever/pain  -As needed phenazopyridine for bladder spasm pain (will turn urine bright orange color)    Obesity  Hepatic steatosis on CT  - recommend weight reduction strategies   - uses ozempic PTA       Diet: Combination Diet Regular Diet Adult    DVT Prophylaxis: Pneumatic Compression Devices  Tyler Catheter: Not present  Central Lines: None  Cardiac Monitoring: None  Code Status: Full Code      Disposition Plan      Expected Discharge Date: 10/04/2022                The patient's care was discussed with the Bedside Nurse and Patient.    Ranjith Buchanan MD  Hospitalist Service  [facility]  Securely message with the Vocera Web Console (learn more here)  Text page via [employer] Paging/Directory         Clinically Significant Risk Factors Present on Admission          # Hypocalcemia: Ca = 8.3 mg/dL (Ref range: 8.5 - 10.1 mg/dL) and/or iCa = N/A on admission, will replace as needed           # Obesity: Estimated body mass index is 34.7 kg/m  as calculated from the following:    Height as of this encounter: 1.676 m (5' 6\").    Weight as of this encounter: 97.5 kg (215 lb).        ______________________________________________________________________    Interval History   Febrile overnight. " Feels better this AM though. Hungry, plans to eat. No rigors or chills since 3 AM. No cp/sob. No n/v. Did have some recent diarrhea after starting abx.  Plans for walk today. Reviewed CT findings. Discussed steatosis and weight loss.     Data reviewed today: I reviewed all medications, new labs and imaging results over the last 24 hours. I personally reviewed the abdominal CT image(s) showing renal stones, right pyelo.    Physical Exam   Vital Signs: Temp: 99.7  F (37.6  C) Temp src: Axillary BP: 116/71 Pulse: 111   Resp: 16 SpO2: 100 % O2 Device: None (Room air)    Weight: 215 lbs 0 oz  Constitutional: Awake, alert, cooperative, no apparent distress  Respiratory: Clear to auscultation bilaterally, no crackles or wheezing  Cardiovascular: Regular rate and rhythm, normal S1 and S2, and no murmur noted  GI: Normal bowel sounds, soft, non-distended, non-tender  Skin/Integumen: No rashes, no cyanosis, no edema  Other:       Data   Recent Labs   Lab 10/01/22  2104   WBC 13.3*   HGB 12.2   MCV 91         POTASSIUM 3.5   CHLORIDE 110*   CO2 21   BUN 8   CR 0.75   ANIONGAP 7   EDY 8.3*   *   ALBUMIN 3.5   PROTTOTAL 7.1   BILITOTAL 0.5   ALKPHOS 64   ALT 31   AST 14     Recent Results (from the past 24 hour(s))   CT Abdomen Pelvis w/o Contrast    Narrative    EXAM: CT ABDOMEN PELVIS W/O CONTRAST  LOCATION: [facility]  DATE/TIME: 10/1/2022 10:03 PM    INDICATION: Flank pain; urinary tract infection.  COMPARISON: CT chest 08/25/2022.  TECHNIQUE: CT scan of the abdomen and pelvis was performed without IV contrast. Multiplanar reformats were obtained. Dose reduction techniques were used.  CONTRAST: None.    FINDINGS:      Absence of intravenous contrast limits the sensitivity of this examination for detection of infectious/inflammatory change, post traumatic abnormalities, vascular abnormalities, and visceral lesions.    LOWER CHEST: Small hiatal hernia. Low attenuation of the  intracardiac blood pool, compatible with anemia.    HEPATOBILIARY: Hepatomegaly and diffuse hepatic steatosis.    Gallbladder is normal.    No intrahepatic or intrahepatic biliary ductal dilatation.    PANCREAS: Normal attenuation. No peripancreatic inflammatory fat stranding.    SPLEEN: Normal attenuation. Normal size.    ADRENAL GLANDS: Normal.    KIDNEYS: Mild prominence of the right renal collecting system, without obstructing stone or mass lesion identified.    Bilateral nonobstructing renal stones.    Urinary bladder is unremarkable.    PELVIC ORGANS: No suspicious abnormality. A left adnexal cystic lesion measures up to 3 cm, within physiologic limits.    BOWEL: Mild wall thickening of the rectum and distal sigmoid colon, likely attributable to incomplete distention. However, there is the appearance of mild extraluminal fat stranding, suggesting a possible superimposed mild proctocolitis.    No intraperitoneal free fluid or free air.    LYMPH NODES: Subcentimeter para-aortic and mesenteric lymph nodes, not meeting size criteria for lymphadenopathy.    VASCULATURE: No abdominal aortic aneurysm.    MUSCULOSKELETAL: No suspicious abnormality. [Limbus vertebra at the L3 level.    OTHER: No additionally significant abnormalities.      Impression    IMPRESSION:   1.  Mild prominence of the right renal collecting system, without obstructing stone or mass lesion identified. Correlate with urinalysis to exclude an urinary tract infection.  2.  Possibly mild proctocolitis involving the rectum and distal sigmoid colon.  3.  Bilateral nonobstructing renal stones.  4.  Hepatomegaly and diffuse hepatic steatosis.  5.  Anemia.         Medications     sodium chloride 100 mL/hr at 10/02/22 0323       cefTRIAXone  2 g Intravenous QPM     sodium chloride (PF)  3 mL Intracatheter Q8H

## 2022-10-02 NOTE — PROVIDER NOTIFICATION
MD  Notified of patients increasing headache, chills and nausea. Po fair.     Orders for IVF and maxalt obtained from MD.

## 2022-10-02 NOTE — PHARMACY-ADMISSION MEDICATION HISTORY
Pharmacy Medication History  Admission medication history interview status for the 10/1/2022  admission is complete. See EPIC admission navigator for prior to admission medications     Location of Interview: Phone  Medication history sources: Patient and Surescripts    Significant changes made to the medication list:  All medications were added       Additional medication history information:   Surescripts was reviewed prior to interviewing the patient. The patient seemed well aware of her medication list and last doses. All medications were added, and are noted below.     Please note, the patient was originally seen by [facility] on 09/28, and diagnosed with a UTI and was prescribed Keflex 500 mg BID. She started this on 09/30, with her last dose on 10/01 (Friday night and Saturday morning). She as then seen by  on 10/1, and started on Rocephin 2 g infusion.     Medication reconciliation completed by provider prior to medication history? No    Time spent in this activity: 15 minutes     Prior to Admission medications    Medication Sig Last Dose Taking? Auth Provider Long Term End Date   acetaminophen (TYLENOL) 500 MG tablet Take 1,000 mg by mouth every 6 hours as needed for mild pain 10/1/2022 at Unknown time Yes Unknown, Entered By History     calcium carbonate 500 mg, elemental, (OSCAL 500) 1250 (500 Ca) MG TABS tablet Take by mouth daily 9/30/2022 Yes Unknown, Entered By History     cephALEXin (KEFLEX) 500 MG capsule Take 500 mg by mouth 2 times daily 10/1/2022 at AM Yes Unknown, Entered By History     ibuprofen (ADVIL/MOTRIN) 200 MG tablet Take 800 mg by mouth every 6 hours as needed for mild pain 10/1/2022 at Unknown time Yes Unknown, Entered By History     MAGNESIUM PO Take 1 capsule by mouth daily 9/30/2022 Yes Unknown, Entered By History     rizatriptan (MAXALT) 5 MG tablet Take 5 mg by mouth at onset of headache for migraine May repeat in 2 hours if needed. Up to 30 mg/24 hours. Past  Month at Unknown time Yes Unknown, Entered By History     Semaglutide (OZEMPIC, 0.25 OR 0.5 MG/DOSE, SC) Inject 0.25 mg Subcutaneous once a week 9/29/2022 Yes Unknown, Entered By History     vitamin D3 (CHOLECALCIFEROL) 50 mcg (2000 units) tablet Take 1 tablet by mouth daily 9/30/2022 Yes Unknown, Entered By History         The information provided in this note is only as accurate as the sources available at the time of update(s)

## 2022-10-03 ENCOUNTER — HOME INFUSION (PRE-WILLOW HOME INFUSION) (OUTPATIENT)
Dept: PHARMACY | Facility: CLINIC | Age: 32
End: 2022-10-03

## 2022-10-03 LAB — BACTERIA UR CULT: NO GROWTH

## 2022-10-03 PROCEDURE — 120N000001 HC R&B MED SURG/OB

## 2022-10-03 PROCEDURE — 250N000013 HC RX MED GY IP 250 OP 250 PS 637: Performed by: INTERNAL MEDICINE

## 2022-10-03 PROCEDURE — 99232 SBSQ HOSP IP/OBS MODERATE 35: CPT | Performed by: STUDENT IN AN ORGANIZED HEALTH CARE EDUCATION/TRAINING PROGRAM

## 2022-10-03 PROCEDURE — 250N000011 HC RX IP 250 OP 636: Performed by: INTERNAL MEDICINE

## 2022-10-03 PROCEDURE — 258N000003 HC RX IP 258 OP 636: Performed by: STUDENT IN AN ORGANIZED HEALTH CARE EDUCATION/TRAINING PROGRAM

## 2022-10-03 RX ADMIN — CEFTRIAXONE SODIUM 2 G: 2 INJECTION, POWDER, FOR SOLUTION INTRAMUSCULAR; INTRAVENOUS at 00:18

## 2022-10-03 RX ADMIN — SODIUM CHLORIDE, POTASSIUM CHLORIDE, SODIUM LACTATE AND CALCIUM CHLORIDE: 600; 310; 30; 20 INJECTION, SOLUTION INTRAVENOUS at 05:31

## 2022-10-03 RX ADMIN — CEFTRIAXONE SODIUM 2 G: 2 INJECTION, POWDER, FOR SOLUTION INTRAMUSCULAR; INTRAVENOUS at 23:52

## 2022-10-03 RX ADMIN — ACETAMINOPHEN 650 MG: 325 TABLET, FILM COATED ORAL at 00:16

## 2022-10-03 RX ADMIN — ACETAMINOPHEN 650 MG: 325 TABLET, FILM COATED ORAL at 09:05

## 2022-10-03 ASSESSMENT — ACTIVITIES OF DAILY LIVING (ADL)
ADLS_ACUITY_SCORE: 20

## 2022-10-03 NOTE — PROGRESS NOTES
A/O x4. VSS on RA. Up independently in room. Voiding in BR.  PIV infusing. On IV abx. C/O headache  Tylenol given . Will continue to monitor.

## 2022-10-03 NOTE — PLAN OF CARE
Goal Outcome Evaluation:   Complained of headache, nausea and chills at change of shift (1500). Temperature progressing to 102.6. MD notified and IVF and home med for migraines entered. Now sleeping well. Headache improved and no nausea. She thinks she might have been overtired too. Denies flank pain. Urine yellow. Po ok. Will continue to monitor.

## 2022-10-03 NOTE — UTILIZATION REVIEW
"    Admission Status; Secondary Review Determination         Under the authority of the Utilization Management Committee, the utilization review process indicated a secondary review on the above patient.  The review outcome is based on review of the medical records, discussions with staff, and applying clinical experience noted on the date of the review.        (X)      Inpatient Status Appropriate - This patient's medical care is consistent with medical management for inpatient care and reasonable inpatient medical practice.      () Observation Status Appropriate - This patient does not meet hospital inpatient criteria and is placed in observation status. If this patient's primary payer is Medicare and was admitted as an inpatient, Condition Code 44 should be used and patient status changed to \"observation\".   () Admission Status NOT Appropriate - This patient's medical care is not consistent with medical management for Inpatient or Observation Status.          RATIONALE FOR DETERMINATION     \" Evelyn Miner is a very pleasant 32 year old female admitted on 10/1/2022 with sepsis due to pyelonephritis.\"     \"Meets sepsis criteria with fever 103 F, tachycardia, leukocytosis. Several liters of IV fluids given in ED course. Had rocephin IM at 2 PM at the [facility] in [place].\"    The patient is on IV rocephin and IVF's and given need for IV Abx , is likely to stay > 2 MN and inpatient status is appropriate.      The severity of illness, intensity of service provided, expected LOS and risk for adverse outcome make the care complex, high risk and appropriate for hospital admission.        The information on this document is developed by the utilization review team in order for the business office to ensure compliance.  This only denotes the appropriateness of proper admission status and does not reflect the quality of care rendered.         The definitions of Inpatient Status and Observation Status used in making the " determination above are those provided in the CMS Coverage Manual, Chapter 1 and Chapter 6, section 70.4.      Sincerely,     CATE WONG MD    Physician Advisor  Utilization Review/ Case Management  [facility].

## 2022-10-03 NOTE — PLAN OF CARE
A&O x4. VS stable temp was 99.2, will continue to monitor. Independent in room. Given tylenol for headache. IV fluid infusing.

## 2022-10-03 NOTE — PROGRESS NOTES
[facility]    Received request for benefit check should pt require home IV abx. Evelyn has coverage for IV abx through their [employer] plan. Pt has a deductible of $750 (met $532.02). She has an 80/20 coverage. OOP is $3000 (met $607.36). Once OOP has been met pt should be covered at 100%.     Thank you     Valentina Mcqueen RN  [facility] Liaison  [phone] (Mon thru Fri 8am - 5pm)  [phone] Office

## 2022-10-03 NOTE — PROGRESS NOTES
Pt has coverage for iv abx through their [employer] plan. Pt has a deductible of $750 (met $532.02). She has an 80/20 coverage. OOP is $3000 (met $607.36).  Once OOP has been met pt should be covered at 100%.         Please contact Intake with any questions, [phone] or In Basket pool,  Home [facility]fusion ([phone]).

## 2022-10-03 NOTE — PLAN OF CARE
Goal Outcome Evaluation:   [id number]  A/OX4, cms intact. Temp of 99.5 this evening. PRN tylenol. Up independent in room. Per pt request, IV SL. IV antibiotics.

## 2022-10-03 NOTE — PROGRESS NOTES
"[facility]    Medicine Progress Note - Hospitalist Service    Date of Admission:  10/1/2022    Assessment & Plan            Evelyn Miner is a very pleasant 32 year old female admitted on 10/1/2022 with sepsis due to pyelonephritis.      Sepsis secondary to UTI, pyelonephritis  Bilateral non-obstructing kindney stones  Meets sepsis criteria with fever 103 F, tachycardia, leukocytosis. Several liters of IV fluids given in ED course. Had rocephin IM at 2 PM at the [facility] in [place]. Urine culture from 9/28/22 with >100k e. Coli pan sensitive.   -antibiotics: IV ceftriaxone pending cultures  -follow up blood and urine cultures  -IV fluids  -As needed acetaminophen and ibuprofen for fever/pain  -As needed phenazopyridine for bladder spasm pain (will turn urine bright orange color)    Obesity  Hepatic steatosis on CT  - recommend weight reduction strategies   - uses ozempic PTA    Migraines  - PTA rizatriptan        Diet: Combination Diet Regular Diet Adult    DVT Prophylaxis: Pneumatic Compression Devices  Tyler Catheter: Not present  Central Lines: None  Cardiac Monitoring: None  Code Status: Full Code      Disposition Plan      Expected Discharge Date: 10/04/2022                The patient's care was discussed with the Bedside Nurse and Patient.    Ranjith Buchanan MD  Hospitalist Service  [facility]  Securely message with the Vocera Web Console (learn more here)  Text page via [employer] Paging/Directory         Clinically Significant Risk Factors Present on Admission                # Obesity: Estimated body mass index is 34.7 kg/m  as calculated from the following:    Height as of this encounter: 1.676 m (5' 6\").    Weight as of this encounter: 97.5 kg (215 lb).        ______________________________________________________________________    Interval History   Febrile yesterday PM. Feeling quite poorly still. Poor appetite and malaise. No flank pain. No cp/sob. " No n/v. Did have some recent diarrhea after starting abx.      Data reviewed today: I reviewed all medications, new labs and imaging results over the last 24 hours. I personally reviewed the abdominal CT image(s) showing renal stones, right pyelo.    Physical Exam   Vital Signs: Temp: 98.7  F (37.1  C) Temp src: Axillary BP: 108/68 Pulse: 96   Resp: 16 SpO2: 97 % O2 Device: None (Room air)    Weight: 215 lbs 0 oz  Constitutional: Awake, alert, cooperative, no apparent distress  Respiratory: Clear to auscultation bilaterally, no crackles or wheezing  Cardiovascular: Regular rate and rhythm, normal S1 and S2, and no murmur noted  GI: Normal bowel sounds, soft, non-distended, non-tender  Skin/Integumen: No rashes, no cyanosis, no edema  Other:       Data   Recent Labs   Lab 10/02/22  0743 10/01/22  2104   WBC 12.0* 13.3*   HGB 11.4* 12.2   MCV 91 91    274   NA  --  138   POTASSIUM  --  3.5   CHLORIDE  --  110*   CO2  --  21   BUN  --  8   CR  --  0.75   ANIONGAP  --  7   EDY  --  8.3*   GLC  --  121*   ALBUMIN  --  3.5   PROTTOTAL  --  7.1   BILITOTAL  --  0.5   ALKPHOS  --  64   ALT  --  31   AST  --  14     No results found for this or any previous visit (from the past 24 hour(s)).  Medications     lactated ringers 75 mL/hr at 10/03/22 0531       cefTRIAXone  2 g Intravenous QPM     sodium chloride (PF)  3 mL Intracatheter Q8H

## 2022-10-04 VITALS
OXYGEN SATURATION: 97 % | TEMPERATURE: 98.1 F | RESPIRATION RATE: 16 BRPM | SYSTOLIC BLOOD PRESSURE: 106 MMHG | WEIGHT: 215 LBS | DIASTOLIC BLOOD PRESSURE: 64 MMHG | HEIGHT: 66 IN | BODY MASS INDEX: 34.55 KG/M2 | HEART RATE: 81 BPM

## 2022-10-04 LAB
ERYTHROCYTE [DISTWIDTH] IN BLOOD BY AUTOMATED COUNT: 11.8 % (ref 10–15)
HCT VFR BLD AUTO: 36.1 % (ref 35–47)
HGB BLD-MCNC: 11.8 G/DL (ref 11.7–15.7)
MCH RBC QN AUTO: 30 PG (ref 26.5–33)
MCHC RBC AUTO-ENTMCNC: 32.7 G/DL (ref 31.5–36.5)
MCV RBC AUTO: 92 FL (ref 78–100)
PLATELET # BLD AUTO: 288 10E3/UL (ref 150–450)
RBC # BLD AUTO: 3.93 10E6/UL (ref 3.8–5.2)
WBC # BLD AUTO: 8.6 10E3/UL (ref 4–11)

## 2022-10-04 PROCEDURE — 85027 COMPLETE CBC AUTOMATED: CPT | Performed by: STUDENT IN AN ORGANIZED HEALTH CARE EDUCATION/TRAINING PROGRAM

## 2022-10-04 PROCEDURE — 36415 COLL VENOUS BLD VENIPUNCTURE: CPT | Performed by: STUDENT IN AN ORGANIZED HEALTH CARE EDUCATION/TRAINING PROGRAM

## 2022-10-04 PROCEDURE — 250N000013 HC RX MED GY IP 250 OP 250 PS 637: Performed by: INTERNAL MEDICINE

## 2022-10-04 PROCEDURE — 99239 HOSP IP/OBS DSCHRG MGMT >30: CPT | Performed by: HOSPITALIST

## 2022-10-04 RX ORDER — CEFDINIR 300 MG/1
300 CAPSULE ORAL 2 TIMES DAILY
Qty: 16 CAPSULE | Refills: 0 | Status: SHIPPED | OUTPATIENT
Start: 2022-10-04 | End: 2022-10-12

## 2022-10-04 RX ADMIN — ACETAMINOPHEN 650 MG: 325 TABLET, FILM COATED ORAL at 09:17

## 2022-10-04 ASSESSMENT — ACTIVITIES OF DAILY LIVING (ADL)
ADLS_ACUITY_SCORE: 20

## 2022-10-04 NOTE — DISCHARGE SUMMARY
[facility]    Discharge Summary  Hospitalist    Date of Admission:  10/1/2022  Date of Discharge:  10/4/2022    Discharge Diagnoses      Sepsis due to Escherichia coli, unspecified whether acute organ dysfunction present (H)  Pyelonephritis, acute    History of Present Illness   Evelyn Miner is an 32 year old female who presented with pyelonephritis    Hospital Course   Evelyn Miner was admitted on 10/1/2022.  The following problems were addressed during her hospitalization:    Evelyn Miner is a very pleasant 32 year old female admitted on 10/1/2022 with sepsis due to pyelonephritis.      Sepsis secondary to UTI, pyelonephritis  Bilateral non-obstructing kindney stones  Meets sepsis criteria with fever 103 F, tachycardia, leukocytosis. Several liters of IV fluids given in ED course. Had rocephin IM at 2 PM at the [facility] in [place]. Urine culture from 9/28/22 with >100k e. Coli pan sensitive.   -antibiotics: IV ceftriaxone pending cultures  -Repeat blood cultures and urine cultures remain negative.  Patient was continued on IV ceftriaxone during inpatient stay and was transition to oral cefdinir to finish a total of 10-day course.  -By day 3 patient was back to baseline with no more flank pain or urinary complaints and remained afebrile.  Leukocytosis resolved.  -As needed acetaminophen and ibuprofen for fever/pain  -As needed phenazopyridine for bladder spasm pain (will turn urine bright orange color)     Obesity  Hepatic steatosis on CT  - recommend weight reduction strategies   - uses ozempic PTA     Migraines  - PTA rizatriptan            Diet: Combination Diet Regular Diet Adult    DVT Prophylaxis: Pneumatic Compression Devices  Tyler Catheter: Not present  Central Lines: None  Cardiac Monitoring: None  Code Status: Full Code          Disposition Plan  Clinically Significant Risk Factors Present on Admission               # Obesity: Estimated body mass index is 34.7 kg/m   "as calculated from the following:    Height as of this encounter: 1.676 m (5' 6\").    Weight as of this encounter: 97.5 kg (215 lb).         Wendy Adkins MD, MD    Pending Results   These results will be followed up by pCP  Unresulted Labs Ordered in the Past 30 Days of this Admission     Date and Time Order Name Status Description    10/1/2022  8:49 PM Blood Culture Arm, Left Preliminary     10/1/2022  8:49 PM Blood Culture Arm, Right Preliminary         Code Status   Full Code       Primary Care Physician   Physician No Ref-Primary    Physical Exam   Temp: 98.1  F (36.7  C) Temp src: Oral BP: 106/64 Pulse: 81   Resp: 16 SpO2: 97 % O2 Device: None (Room air)    Vitals:    10/01/22 2034   Weight: 97.5 kg (215 lb)     Vital Signs with Ranges  Temp:  [98.1  F (36.7  C)-99.5  F (37.5  C)] 98.1  F (36.7  C)  Pulse:  [81-92] 81  Resp:  [16] 16  BP: (105-117)/(64-68) 106/64  SpO2:  [97 %-99 %] 97 %  I/O last 3 completed shifts:  In: 240 [P.O.:240]  Out: -     Physical Exam  Constitutional:       Appearance: Normal appearance.   HENT:      Head: Normocephalic.   Eyes:      Pupils: Pupils are equal, round, and reactive to light.   Cardiovascular:      Rate and Rhythm: Normal rate and regular rhythm.      Pulses: Normal pulses.      Heart sounds: Normal heart sounds.   Pulmonary:      Effort: Pulmonary effort is normal. No respiratory distress.      Breath sounds: Normal breath sounds.   Abdominal:      General: Abdomen is flat. Bowel sounds are normal. There is no distension.      Tenderness: There is no abdominal tenderness. There is no guarding.   Musculoskeletal:         General: Normal range of motion.      Cervical back: Normal range of motion.   Skin:     General: Skin is warm and dry.   Neurological:      General: No focal deficit present.   Psychiatric:         Mood and Affect: Mood normal.           Discharge Disposition   Discharged to home  Condition at discharge: Stable    Consultations This Hospital Stay "   None    Time Spent on this Encounter   I, Wendy Adkins MD, personally saw the patient today and spent greater than 30 minutes discharging this patient.    Discharge Orders      Reason for your hospital stay    Pyelonephritis     Follow-up and recommended labs and tests     Follow up with primary care provider, Physician No Ref-Primary, within 7 days for hospital follow- up.  The following labs/tests are recommended: cbc, bmp in 1 week.     Activity    Your activity upon discharge: activity as tolerated     Diet    Follow this diet upon discharge: Orders Placed This Encounter      Combination Diet Regular Diet Adult     Discharge Medications   Current Discharge Medication List      START taking these medications    Details   cefdinir (OMNICEF) 300 MG capsule Take 1 capsule (300 mg) by mouth 2 times daily for 8 days  Qty: 16 capsule, Refills: 0    Associated Diagnoses: Pyelonephritis, acute         CONTINUE these medications which have NOT CHANGED    Details   acetaminophen (TYLENOL) 500 MG tablet Take 1,000 mg by mouth every 6 hours as needed for mild pain      calcium carbonate 500 mg, elemental, (OSCAL 500) 1250 (500 Ca) MG TABS tablet Take by mouth daily      ibuprofen (ADVIL/MOTRIN) 200 MG tablet Take 800 mg by mouth every 6 hours as needed for mild pain      MAGNESIUM PO Take 1 capsule by mouth daily      rizatriptan (MAXALT) 5 MG tablet Take 5 mg by mouth at onset of headache for migraine May repeat in 2 hours if needed. Up to 30 mg/24 hours.      Semaglutide (OZEMPIC, 0.25 OR 0.5 MG/DOSE, SC) Inject 0.25 mg Subcutaneous once a week      vitamin D3 (CHOLECALCIFEROL) 50 mcg (2000 units) tablet Take 1 tablet by mouth daily         STOP taking these medications       cephALEXin (KEFLEX) 500 MG capsule Comments:   Reason for Stopping:             Allergies   No Known Allergies  Data   Recent Labs   Lab Test 10/04/22  0722 10/02/22  0743 10/01/22  2104   WBC 8.6 12.0* 13.3*   HGB 11.8 11.4* 12.2   MCV 92 91 91     245 274      Recent Labs   Lab Test 10/01/22  2104 06/24/21  2030 10/08/19  0927 03/05/17  0124    138  --  141   POTASSIUM 3.5 3.7  --  3.7   CHLORIDE 110* 105  --  106   CO2 21 28  --  24   BUN 8 14  --  7   CR 0.75 0.72  --  0.52   ANIONGAP 7 5  --  11   EDY 8.3* 8.9  --  8.4*   * 78 75 103*         Results for orders placed or performed during the hospital encounter of 10/01/22   CT Abdomen Pelvis w/o Contrast    Narrative    EXAM: CT ABDOMEN PELVIS W/O CONTRAST  LOCATION: [facility]  DATE/TIME: 10/1/2022 10:03 PM    INDICATION: Flank pain; urinary tract infection.  COMPARISON: CT chest 08/25/2022.  TECHNIQUE: CT scan of the abdomen and pelvis was performed without IV contrast. Multiplanar reformats were obtained. Dose reduction techniques were used.  CONTRAST: None.    FINDINGS:      Absence of intravenous contrast limits the sensitivity of this examination for detection of infectious/inflammatory change, post traumatic abnormalities, vascular abnormalities, and visceral lesions.    LOWER CHEST: Small hiatal hernia. Low attenuation of the intracardiac blood pool, compatible with anemia.    HEPATOBILIARY: Hepatomegaly and diffuse hepatic steatosis.    Gallbladder is normal.    No intrahepatic or intrahepatic biliary ductal dilatation.    PANCREAS: Normal attenuation. No peripancreatic inflammatory fat stranding.    SPLEEN: Normal attenuation. Normal size.    ADRENAL GLANDS: Normal.    KIDNEYS: Mild prominence of the right renal collecting system, without obstructing stone or mass lesion identified.    Bilateral nonobstructing renal stones.    Urinary bladder is unremarkable.    PELVIC ORGANS: No suspicious abnormality. A left adnexal cystic lesion measures up to 3 cm, within physiologic limits.    BOWEL: Mild wall thickening of the rectum and distal sigmoid colon, likely attributable to incomplete distention. However, there is the appearance of mild extraluminal  fat stranding, suggesting a possible superimposed mild proctocolitis.    No intraperitoneal free fluid or free air.    LYMPH NODES: Subcentimeter para-aortic and mesenteric lymph nodes, not meeting size criteria for lymphadenopathy.    VASCULATURE: No abdominal aortic aneurysm.    MUSCULOSKELETAL: No suspicious abnormality. [Limbus vertebra at the L3 level.    OTHER: No additionally significant abnormalities.      Impression    IMPRESSION:   1.  Mild prominence of the right renal collecting system, without obstructing stone or mass lesion identified. Correlate with urinalysis to exclude an urinary tract infection.  2.  Possibly mild proctocolitis involving the rectum and distal sigmoid colon.  3.  Bilateral nonobstructing renal stones.  4.  Hepatomegaly and diffuse hepatic steatosis.  5.  Anemia.

## 2022-10-04 NOTE — PROGRESS NOTES
Pt left home medications at hospital that were locked up. Called and spoke with pt. Pt states to just get rid of them.

## 2022-10-04 NOTE — PROGRESS NOTES
Pt is ready for discharge . Discharge education and follow-ups appointments explained to the patient. All patient belongings are sent with the patient. Prescription sent to [facility] pharmacy. Baljit Borges RN on 10/4/2022 at 11:19 AM

## 2022-10-04 NOTE — PROGRESS NOTES
[facility]   [address] [place], [place] [address]  Hospitalist Service  Answering Service [phone]        10/4/2022          To whom it may concern,       Evelyn Miner MRN# [id number]   YOB: 1990 Age: 32 year old       Date of Admission:  10/1/2022  Date of Discharge:  10/4/2022  Admitting Physician:  Oksana Sullivan MD  Discharge Physician:  Oksana Sullivan*  Discharging Service:  Hospitalist  Primary Provider: No Ref-Primary, Physician    Can return to work after f/u with pcp    Restrictions:  No    Wendy Adkins MD

## 2022-10-04 NOTE — PLAN OF CARE
AxOx4, VSS on room air, patient broke a sweat in the early evening but otherwise stable temp and vitals, denies chills, adequate hydration, denies pain and discomfort,  no PRNs given, up independently.

## 2022-10-05 ENCOUNTER — PATIENT OUTREACH (OUTPATIENT)
Dept: CARE COORDINATION | Facility: CLINIC | Age: 32
End: 2022-10-05

## 2022-10-05 NOTE — PROGRESS NOTES
[facility]    Background: Transitional Care Management program auto-identified and prompting a chart review by [facility] team.    Assessment: Upon chart review, CCR Team member will cancel/close this episode of Transitional Care Management program due to reason below:    Patient declined to answer all  post hospital discharge questions with CCRC team member and disconnected call. pt states she is doing fine though.    Plan: Transitional Care Management episode closed per reason above.      Estela Stack MA  [facility] Care Resource [place], [facility]    *Connected Care Resource Team does NOT follow patient ongoing. Referrals are identified based on internal discharge reports and the outreach is to ensure patient has an understanding of their discharge instructions.

## 2022-10-06 LAB
BACTERIA BLD CULT: NO GROWTH
BACTERIA BLD CULT: NO GROWTH

## 2022-10-22 ENCOUNTER — HEALTH MAINTENANCE LETTER (OUTPATIENT)
Age: 32
End: 2022-10-22

## 2023-11-05 ENCOUNTER — HEALTH MAINTENANCE LETTER (OUTPATIENT)
Age: 33
End: 2023-11-05

## 2024-08-31 ENCOUNTER — HOSPITAL ENCOUNTER (EMERGENCY)
Facility: CLINIC | Age: 34
Discharge: HOME | End: 2024-09-01
Attending: EMERGENCY MEDICINE | Admitting: EMERGENCY MEDICINE
Payer: COMMERCIAL

## 2024-08-31 VITALS
RESPIRATION RATE: 16 BRPM | HEIGHT: 66 IN | DIASTOLIC BLOOD PRESSURE: 85 MMHG | HEART RATE: 107 BPM | TEMPERATURE: 99 F | WEIGHT: 194.89 LBS | OXYGEN SATURATION: 97 % | SYSTOLIC BLOOD PRESSURE: 139 MMHG | BODY MASS INDEX: 31.32 KG/M2

## 2024-08-31 DIAGNOSIS — M54.32 SCIATICA OF LEFT SIDE: ICD-10-CM

## 2024-08-31 PROCEDURE — 99284 EMERGENCY DEPT VISIT MOD MDM: CPT | Mod: 25

## 2024-08-31 ASSESSMENT — COLUMBIA-SUICIDE SEVERITY RATING SCALE - C-SSRS
6. HAVE YOU EVER DONE ANYTHING, STARTED TO DO ANYTHING, OR PREPARED TO DO ANYTHING TO END YOUR LIFE?: NO
1. IN THE PAST MONTH, HAVE YOU WISHED YOU WERE DEAD OR WISHED YOU COULD GO TO SLEEP AND NOT WAKE UP?: NO
2. HAVE YOU ACTUALLY HAD ANY THOUGHTS OF KILLING YOURSELF IN THE PAST MONTH?: NO

## 2024-09-01 PROCEDURE — 250N000013 HC RX MED GY IP 250 OP 250 PS 637: Performed by: EMERGENCY MEDICINE

## 2024-09-01 PROCEDURE — 250N000011 HC RX IP 250 OP 636: Performed by: EMERGENCY MEDICINE

## 2024-09-01 PROCEDURE — 96372 THER/PROPH/DIAG INJ SC/IM: CPT | Performed by: EMERGENCY MEDICINE

## 2024-09-01 RX ORDER — HYDROXYZINE PAMOATE 25 MG/1
25-50 CAPSULE ORAL 3 TIMES DAILY PRN
Qty: 20 CAPSULE | Refills: 0 | Status: SHIPPED | OUTPATIENT
Start: 2024-09-01

## 2024-09-01 RX ORDER — OXYCODONE HYDROCHLORIDE 5 MG/1
10 TABLET ORAL ONCE
Status: COMPLETED | OUTPATIENT
Start: 2024-09-01 | End: 2024-09-01

## 2024-09-01 RX ORDER — KETOROLAC TROMETHAMINE 30 MG/ML
30 INJECTION, SOLUTION INTRAMUSCULAR; INTRAVENOUS ONCE
Status: COMPLETED | OUTPATIENT
Start: 2024-09-01 | End: 2024-09-01

## 2024-09-01 RX ADMIN — KETOROLAC TROMETHAMINE 30 MG: 30 INJECTION, SOLUTION INTRAMUSCULAR at 00:33

## 2024-09-01 RX ADMIN — OXYCODONE HYDROCHLORIDE 10 MG: 5 TABLET ORAL at 00:32

## 2024-09-01 NOTE — ED TRIAGE NOTES
JOAN WOLF earlier today. Thinks she herniated disks in her back. Given robaxin to avoid sedation, didn't help.  Flexeril and tylenol taken last at 2100. Left leg is numb and weak per pt report. Denies incontinence of B&B.

## 2024-09-01 NOTE — ED PROVIDER NOTES
Emergency Department Note      History of Present Illness     Chief Complaint  Back Pain    HPI  Evelyn Miner is a 34 year old female with history of lumbar radiculopathy and kidney stones who presents to the ED for evaluation of back pain. She reports picking an object from the floor yesterday and thinks she flared up her herniated disks. Patient has been having back pain for months which has been worsening.  Patient was told having bulging disks a month ago at [facility]. She was seen at [facility] yesterday morning and was prescribed prednisone. She took the first dose at 2330. She thought there was no relief so she took flexeril with no relief. She states not feeling her left foot and numbness down her left leg. She expresses being very tired and not being able to sleep due to the pain. Denies fever, bowel incontinence, or urine incontinence. She mentions getting an back injection four days ago with mild relief. No chance of pregnancy.     Independent Historian  None    Review of External Notes  Office visit from [facility] orthopedic urgent care reviewed from earlier today.  She was prescribed prednisone and Robaxin at that time with concerns for left lumbar radiculopathy.  Referred to neurosurgery.  No concerns at that time for acute surgical process.  MRI imaging of the lumbar spine was reviewed from 2024 which demonstrated slight progression of left paracentral disc extrusion at L4-L5.  As well as disc bulge at L5-S1.  Past Medical History   Medical History and Problem List  GERD  Kidney stones  Migraines   Hypothyroidism   Anxiety   Ureterolithiasis   Lumbar radiculopathy  PVC's    Medications  Ozempic  Magnesium  Calcium carbonate  Vitamin D3  Robaxin     Surgical History   Appendectomy    Tonsillectomy and adenoidectomy   Knee join manipulation  [place] teeth extraction  Physical Exam   Patient Vitals for the past 24 hrs:   BP Temp Temp src Pulse Resp SpO2 Height Weight   24 2346 139/85  "99  F (37.2  C) Temporal 107 16 97 % 1.676 m (5' 6\") 88.4 kg (194 lb 14.2 oz)     Physical Exam  Gen: Uncomfortable appearing adult, standing in the room  CV: 2+ DP and posterior tibial pulses in the left foot.  MSK: No back tenderness.  No buttock tenderness.  Normal range of motion of the extremities bilaterally. 5/5 strength bilateral LE with hip flexion, adduction and abduction, knee flexion and extension, foot dorsiflexion and plantarflexion. No edema.   Skin: No rashes, erythema, or ecchymosis. Warm and dry.  Neuro: Alert and oriented. Answers all questions and follows all commands. No focal deficits appreciated.  Decreased but present sensation to light touch over the left calf and left lateral foot compared to the right.  1+ patellar reflex bilaterally. 4/5 great toe dorsiflexion strength on the left compared to the right.  Ambulatory without limp.  Able to go up on toes and up on heels bilaterally   Psych: Normal affect.     Diagnostics     ED Course    Medications Administered  Medications   ketorolac (TORADOL) injection 30 mg (30 mg Intramuscular $Given 9/1/24 0033)   oxyCODONE (ROXICODONE) tablet 10 mg (10 mg Oral $Given 9/1/24 0032)     Procedures  Procedures     Discussion of Management  None    Optional/Additional Documentation  None    ED Course  ED Course as of 09/01/24 0044   Sun Sep 01, 2024   0020 I obtained history and examined the patient as noted above.    0028 I prepared the patient to be discharged home.      Medical Decision Making / Diagnosis   CMS Diagnoses: None    MIPS     None    [facility]  Evelyn Miner is a 34 year old female who presents for evaluation of left-sided radicular symptoms. They have a history of similar symptoms in the past.  They were evaluated earlier today by orthopedic urgent care and prescribed prednisone and muscle relaxants without improvement.  She has evidence of radicular symptoms in the lumbar region consistent with findings on past MRI.  No evidence suggest " cauda equina syndrome.  Exam overall not concerning for need for acute neurosurgical evaluation. Advanced imaging with CT/MRI is not indicated at this time, but may be indicated in the future if symptoms fail to resolve.   There is no clinical evidence of cauda equina syndrome, discitis, spinal/epidural space hematoma or epidural abscess or other emergently worrisome etiology.     The neurological exam is normal, with the exception of the dermatomal symptoms noted.  The patient was advised that radiculopathy often takes significant time to resolve, and that follow up with primary care, neurology and/or neurosurgery will be indicated if symptoms do not improve. The patient will be discharged with pain medications to use as directed.  No heavy lifting, bending or twisting. Return if increasing pain, muscular weakness, or bowel or bladder dysfunction.  She will continue with the medications prescribed by orthopedist as well as another adjunctive medication.    Disposition  The patient was discharged.     ICD-10 Codes:    ICD-10-CM    1. Sciatica of left side  M54.32          Discharge Medications  Discharge Medication List as of 9/1/2024 12:38 AM        START taking these medications    Details   hydrOXYzine ferdinand (VISTARIL) 25 MG capsule Take 1-2 capsules (25-50 mg) by mouth 3 times daily as needed for other (pain, difficulty sleeping)., Disp-20 capsule, R-0, E-Prescribe           Scribe Disclosure:  Mary Carmen FINK, am serving as a scribe at 12:38 AM on 9/1/2024 to document services personally performed by Adelaide Bach MD based on my observations and the provider's statements to me.      Adelaide Bach MD  09/01/24 0601

## 2024-11-05 ENCOUNTER — APPOINTMENT (OUTPATIENT)
Dept: MRI IMAGING | Facility: CLINIC | Age: 34
End: 2024-11-05
Attending: STUDENT IN AN ORGANIZED HEALTH CARE EDUCATION/TRAINING PROGRAM
Payer: COMMERCIAL

## 2024-11-05 ENCOUNTER — HOSPITAL ENCOUNTER (EMERGENCY)
Facility: CLINIC | Age: 34
Discharge: HOME | End: 2024-11-06
Attending: STUDENT IN AN ORGANIZED HEALTH CARE EDUCATION/TRAINING PROGRAM | Admitting: STUDENT IN AN ORGANIZED HEALTH CARE EDUCATION/TRAINING PROGRAM
Payer: COMMERCIAL

## 2024-11-05 VITALS
DIASTOLIC BLOOD PRESSURE: 91 MMHG | RESPIRATION RATE: 18 BRPM | TEMPERATURE: 97.4 F | WEIGHT: 202.6 LBS | HEART RATE: 95 BPM | BODY MASS INDEX: 32.7 KG/M2 | OXYGEN SATURATION: 100 % | SYSTOLIC BLOOD PRESSURE: 128 MMHG

## 2024-11-05 DIAGNOSIS — R20.2 PARESTHESIAS: ICD-10-CM

## 2024-11-05 PROCEDURE — 70544 MR ANGIOGRAPHY HEAD W/O DYE: CPT

## 2024-11-05 PROCEDURE — 99285 EMERGENCY DEPT VISIT HI MDM: CPT | Mod: 25

## 2024-11-05 PROCEDURE — 255N000002 HC RX 255 OP 636: Performed by: STUDENT IN AN ORGANIZED HEALTH CARE EDUCATION/TRAINING PROGRAM

## 2024-11-05 PROCEDURE — 70553 MRI BRAIN STEM W/O & W/DYE: CPT

## 2024-11-05 PROCEDURE — A9585 GADOBUTROL INJECTION: HCPCS | Performed by: STUDENT IN AN ORGANIZED HEALTH CARE EDUCATION/TRAINING PROGRAM

## 2024-11-05 PROCEDURE — 70549 MR ANGIOGRAPH NECK W/O&W/DYE: CPT

## 2024-11-05 RX ORDER — GADOBUTROL 604.72 MG/ML
10 INJECTION INTRAVENOUS ONCE
Status: COMPLETED | OUTPATIENT
Start: 2024-11-05 | End: 2024-11-05

## 2024-11-05 RX ADMIN — GADOBUTROL 10 ML: 604.72 INJECTION INTRAVENOUS at 23:09

## 2024-11-05 ASSESSMENT — COLUMBIA-SUICIDE SEVERITY RATING SCALE - C-SSRS
1. IN THE PAST MONTH, HAVE YOU WISHED YOU WERE DEAD OR WISHED YOU COULD GO TO SLEEP AND NOT WAKE UP?: NO
2. HAVE YOU ACTUALLY HAD ANY THOUGHTS OF KILLING YOURSELF IN THE PAST MONTH?: NO
6. HAVE YOU EVER DONE ANYTHING, STARTED TO DO ANYTHING, OR PREPARED TO DO ANYTHING TO END YOUR LIFE?: NO

## 2024-11-05 ASSESSMENT — ACTIVITIES OF DAILY LIVING (ADL): ADLS_ACUITY_SCORE: 0

## 2024-11-06 NOTE — ED TRIAGE NOTES
Pt here with c/o L sided hand numbness, weakness, states she reached back with that hand and it just didn't feel right. States that she felt some dizziness and confusion when this happened as well. States her hand and leg feel more numb since this happened, ambulating without difficulty. Neuro eval called in triage, MD @ bedside.      Triage Assessment (Adult)       Row Name 11/05/24 1707          Triage Assessment    Airway WDL WDL        Respiratory WDL    Respiratory WDL WDL        Skin Circulation/Temperature WDL    Skin Circulation/Temperature WDL WDL        Cardiac WDL    Cardiac WDL WDL        Peripheral/Neurovascular WDL    Peripheral Neurovascular WDL WDL        Cognitive/Neuro/Behavioral WDL    Cognitive/Neuro/Behavioral WDL WDL

## 2024-11-06 NOTE — ED PROVIDER NOTES
"  Emergency Department Note      History of Present Illness     Chief Complaint   One-sided Weakness      HPI   Evelyn Miner is a 34 year old female with a history of thyroid disease who presents to the ER for left sided weakness and paresthesias. Patient reports reaching back for something when her left hand and arm suddenly went numb. She states that the numbness is reduced now but the arm still feels \"weird\". She endorses left facial paresthesias, left leg numbness at baseline but it worsening since the incident, dizziness, left foot numbness at baseline, having a slipped disk in her back, confusion, vision changes, and her left hand feeling weak. Evelyn denies family history of stroke or other medical issues. Patient recalls having migraines with vision changes and that she has had 2 in the last week and a half but is not having one right now.     Independent Historian   None    Review of External Notes   none    Past Medical History     Medical History and Problem List   GERD  Kidney stones  Migraine   Thyroid disease  PVC'sanx     Medications   Hydroxyzine   Rizatriptan     Surgical History   Appendectomy    section   [place] teeth extraction   Physical Exam     Patient Vitals for the past 24 hrs:   BP Temp Temp src Pulse Resp SpO2 Weight   24 2231 (!) 128/91 97.4  F (36.3  C) Temporal 95 18 100 % 91.9 kg (202 lb 9.6 oz)     Physical Exam  GENERAL: Patient well-appearing  HEAD: Atraumatic.  EYES: Anicteric. PERRL  NOSE: No active bleeding  MOUTH: Moist mucosa  THROAT: Patent airway.   NECK: No rigidity  CV: RRR, no murmurs, rubs or gallops  PULM: CTAB with good aeration; no retractions, rales, rhonchi, or wheezing  ABD: Soft, nontender, nondistended, no guarding, no peritoneal signs   DERM: No rash. Skin warm and dry  EXTREMITY: Moving all extremities without difficulty. No calf tenderness or peripheral edema  VASCULAR: Symmetric pulses bilaterally  NEURO: Alert.  Answering questions " appropriately.  Speech is clear.  CN 2-12 fully intact. Strength 5/5 bilateral LE/UE. Sensation fully intact to light touch symmetrically bilateral LE/UE. Normal finger-to-nose and heel to shin. No nystagmus. No ataxia.   Diagnostics     Lab Results   Labs Ordered and Resulted from Time of ED Arrival to Time of ED Departure - No data to display    Imaging   MR Brain w/o & w Contrast   Final Result   IMPRESSION:   1.  Normal head MRI.      MRA Angiogram Head w/o Contrast   Final Result   IMPRESSION:      HEAD MRA:    1.  Normal MRA [employer] [facility] [place].      NECK MRA:   1.  Normal neck MRA.      MRA Angiogram Neck w/o & w Contrast   Final Result   IMPRESSION:      HEAD MRA:    1.  Normal MRA [facility] [facility] [place].      NECK MRA:   1.  Normal neck MRA.        Independent Interpretation   None    ED Course      Medications Administered   Medications   gadobutrol (GADAVIST) injection 10 mL (10 mLs Intravenous $Given 11/5/24 2309)   sodium chloride (PF) 0.9% PF flush 60 mL (100 mLs Intravenous $Given 11/5/24 2309)       Discussion of Management   None    ED Course   ED Course as of 11/06/24 0133   Tue Nov 05, 2024 2235 I obtained the history and examined the patient as noted above.        Additional Documentation  None    Medical Decision Making / Diagnosis     CMS Diagnoses: None    MIPS       None    MDM   Evelyn Miner is a 34 year old female presenting with left-sided paresthesias and confusion.  Differential diagnosis-considered intracranial bleed, CVA, among others.  Patient does have history of ocular migraines in the past.  No current headache.  Unremarkable neurologic exam.  No slurred speech.  She is answering all questions appropriately and does not appear confused.  Due to her only having left-sided symptoms, I ordered MRI brain and MRA head and neck to evaluate for dissection versus CVA and it is unremarkable.  Thus, do not think she requires further workup.  She is very well-appearing repeat assessments.  Placed  neurology referral.  Do not think patient requires admission.  I have evaluated the patient for acute medical emergencies and have clinically decided no further acute medical interventions are required. Reassessed multiple times and well appearing. Patient stable for discharge. All questions answered. Given strict return precautions. Patient content with plan. The differential diagnosis and treatment modalities were discussed thoroughly with the patient. Recommended PCP follow-up in 2-3 days.      Disposition   The patient was discharged.     Diagnosis     ICD-10-CM    1. Paresthesias  R20.2 Adult Neurology  Referral           Discharge Medications   Discharge Medication List as of 11/6/2024 12:14 AM            Scribe Disclosure:  I, Emery Gutierrez, am serving as a scribe at 10:41 PM on 11/5/2024 to document services personally performed by Toño Parson MD based on my observations and the provider's statements to me.        Toño Parson MD  11/06/24 0136

## 2024-12-22 ENCOUNTER — HEALTH MAINTENANCE LETTER (OUTPATIENT)
Age: 34
End: 2024-12-22

## (undated) DEVICE — SUCTION CANISTER MEDIVAC LINER 3000ML W/LID 65651-530

## (undated) DEVICE — PREP CHLORAPREP 26ML TINTED ORANGE  260815

## (undated) DEVICE — BLADE CLIPPER SGL USE 9680

## (undated) DEVICE — SUCTION CANISTER STRAW 65652-008

## (undated) DEVICE — DRSG ABDOMINAL 12X16"

## (undated) DEVICE — GLOVE PROTEXIS W/NEU-THERA 7.5  2D73TE75

## (undated) DEVICE — SU VICRYL 0 CT 36" J358H

## (undated) DEVICE — LINEN TOWEL PACK X10 5473

## (undated) DEVICE — SOL NACL 0.9% IRRIG 1000ML BOTTLE 2F7124

## (undated) DEVICE — DRSG ABDOMINAL 07 1/2X8" 7197D

## (undated) DEVICE — SU MONOCRYL 0 CT-1 36" UND Y946H

## (undated) DEVICE — BLADE KNIFE SURG 10 371110

## (undated) DEVICE — GLOVE PROTEXIS POWDER FREE 6.0 ORTHOPEDIC 2D73ET60

## (undated) DEVICE — PACK C-SECTION LF PL15OTA83B

## (undated) DEVICE — ESU GROUND PAD ADULT W/CORD E7507

## (undated) DEVICE — CATH TRAY FOLEY 16FR DRAINAGE BAG STATLOCK 899916

## (undated) DEVICE — DRSG TEGADERM 4X10" 1627

## (undated) DEVICE — LINEN DRAPE 54X72" 5467

## (undated) DEVICE — LINEN HALF SHEET 5512

## (undated) DEVICE — LINEN FULL SHEET 5511

## (undated) DEVICE — SU VICRYL 3-0 CT-1 36" J944H

## (undated) DEVICE — DRSG STERI STRIP 1/2X4" R1547

## (undated) DEVICE — GLOVE PROTEXIS BLUE W/NEU-THERA 8.0  2D73EB80

## (undated) DEVICE — CAP BABY PINK/BLUE IC-2

## (undated) DEVICE — BAG CLEAR TRASH 1.3M 39X33" P4040C

## (undated) DEVICE — LINEN BABY BLANKET 5434

## (undated) DEVICE — PAD CHUX UNDERPAD 30X36" P3036C

## (undated) DEVICE — SU VICRYL 4-0 PS-2 18" UND J496H

## (undated) DEVICE — STOCKING SLEEVE VASOPRESS COMPRESSION CALF MED VP501M

## (undated) DEVICE — TRANSFER DEVICE BLOOD NDL HOLDER 364880

## (undated) DEVICE — GLOVE PROTEXIS POWDER FREE SMT 6.5  2D72PT65X

## (undated) DEVICE — SOL WATER IRRIG 1000ML BOTTLE 2F7114

## (undated) RX ORDER — ONDANSETRON 2 MG/ML
INJECTION INTRAMUSCULAR; INTRAVENOUS
Status: DISPENSED
Start: 2017-03-03